# Patient Record
Sex: FEMALE | Race: WHITE | NOT HISPANIC OR LATINO | Employment: UNEMPLOYED | URBAN - METROPOLITAN AREA
[De-identification: names, ages, dates, MRNs, and addresses within clinical notes are randomized per-mention and may not be internally consistent; named-entity substitution may affect disease eponyms.]

---

## 2020-10-27 LAB
ALBUMIN SERPL BCP-MCNC: 4.7 G/DL (ref 3.5–5.2)
ALP SERPL-CCNC: 106 U/L (ref 55–135)
ALT SERPL W/O P-5'-P-CCNC: 11 U/L (ref 10–44)
ANION GAP SERPL CALC-SCNC: 12 MMOL/L (ref 8–16)
AST SERPL-CCNC: 18 U/L (ref 10–40)
BASOPHILS # BLD AUTO: 0.05 K/UL (ref 0–0.2)
BASOPHILS NFR BLD: 0.6 % (ref 0–1.9)
BILIRUB SERPL-MCNC: 0.7 MG/DL (ref 0.1–1)
BUN SERPL-MCNC: 11 MG/DL (ref 8–23)
CALCIUM SERPL-MCNC: 10 MG/DL (ref 8.7–10.5)
CHLORIDE SERPL-SCNC: 106 MMOL/L (ref 95–110)
CO2 SERPL-SCNC: 25 MMOL/L (ref 23–29)
CREAT SERPL-MCNC: 1.2 MG/DL (ref 0.5–1.4)
DIFFERENTIAL METHOD: ABNORMAL
EOSINOPHIL # BLD AUTO: 0 K/UL (ref 0–0.5)
EOSINOPHIL NFR BLD: 0.3 % (ref 0–8)
ERYTHROCYTE [DISTWIDTH] IN BLOOD BY AUTOMATED COUNT: 12.9 % (ref 11.5–14.5)
EST. GFR  (AFRICAN AMERICAN): 54 ML/MIN/1.73 M^2
EST. GFR  (NON AFRICAN AMERICAN): 47 ML/MIN/1.73 M^2
GLUCOSE SERPL-MCNC: 136 MG/DL (ref 70–110)
HCT VFR BLD AUTO: 37.9 % (ref 37–48.5)
HGB BLD-MCNC: 13.1 G/DL (ref 12–16)
IMM GRANULOCYTES # BLD AUTO: 0.02 K/UL (ref 0–0.04)
IMM GRANULOCYTES NFR BLD AUTO: 0.3 % (ref 0–0.5)
LYMPHOCYTES # BLD AUTO: 1.1 K/UL (ref 1–4.8)
LYMPHOCYTES NFR BLD: 14.3 % (ref 18–48)
MAGNESIUM SERPL-MCNC: 2 MG/DL (ref 1.6–2.6)
MCH RBC QN AUTO: 30.9 PG (ref 27–31)
MCHC RBC AUTO-ENTMCNC: 34.6 G/DL (ref 32–36)
MCV RBC AUTO: 89 FL (ref 82–98)
MONOCYTES # BLD AUTO: 0.9 K/UL (ref 0.3–1)
MONOCYTES NFR BLD: 10.9 % (ref 4–15)
NEUTROPHILS # BLD AUTO: 5.9 K/UL (ref 1.8–7.7)
NEUTROPHILS NFR BLD: 73.6 % (ref 38–73)
NRBC BLD-RTO: 0 /100 WBC
PLATELET # BLD AUTO: 343 K/UL (ref 150–350)
PMV BLD AUTO: 10 FL (ref 9.2–12.9)
POTASSIUM SERPL-SCNC: 3.2 MMOL/L (ref 3.5–5.1)
PROT SERPL-MCNC: 7.8 G/DL (ref 6–8.4)
RBC # BLD AUTO: 4.24 M/UL (ref 4–5.4)
SODIUM SERPL-SCNC: 143 MMOL/L (ref 136–145)
WBC # BLD AUTO: 7.95 K/UL (ref 3.9–12.7)

## 2020-10-27 PROCEDURE — 99285 EMERGENCY DEPT VISIT HI MDM: CPT | Mod: 25

## 2020-10-27 PROCEDURE — 96376 TX/PRO/DX INJ SAME DRUG ADON: CPT

## 2020-10-27 PROCEDURE — 84484 ASSAY OF TROPONIN QUANT: CPT

## 2020-10-27 PROCEDURE — 83735 ASSAY OF MAGNESIUM: CPT

## 2020-10-27 PROCEDURE — 85379 FIBRIN DEGRADATION QUANT: CPT

## 2020-10-27 PROCEDURE — 85025 COMPLETE CBC W/AUTO DIFF WBC: CPT

## 2020-10-27 PROCEDURE — 96374 THER/PROPH/DIAG INJ IV PUSH: CPT

## 2020-10-27 PROCEDURE — 80053 COMPREHEN METABOLIC PANEL: CPT

## 2020-10-28 ENCOUNTER — HOSPITAL ENCOUNTER (EMERGENCY)
Facility: HOSPITAL | Age: 65
End: 2020-10-28
Attending: EMERGENCY MEDICINE
Payer: MEDICAID

## 2020-10-28 VITALS
OXYGEN SATURATION: 98 % | TEMPERATURE: 98 F | SYSTOLIC BLOOD PRESSURE: 110 MMHG | HEART RATE: 77 BPM | DIASTOLIC BLOOD PRESSURE: 66 MMHG | WEIGHT: 150 LBS | RESPIRATION RATE: 18 BRPM

## 2020-10-28 DIAGNOSIS — F41.9 ANXIETY: ICD-10-CM

## 2020-10-28 DIAGNOSIS — R00.0 TACHYCARDIA: ICD-10-CM

## 2020-10-28 DIAGNOSIS — F99 INSOMNIA DUE TO OTHER MENTAL DISORDER: ICD-10-CM

## 2020-10-28 DIAGNOSIS — F32.A DEPRESSION, UNSPECIFIED DEPRESSION TYPE: Primary | ICD-10-CM

## 2020-10-28 DIAGNOSIS — F51.05 INSOMNIA DUE TO OTHER MENTAL DISORDER: ICD-10-CM

## 2020-10-28 LAB
AMPHET+METHAMPHET UR QL: NEGATIVE
APAP SERPL-MCNC: <3 UG/ML (ref 10–20)
BACTERIA #/AREA URNS HPF: ABNORMAL /HPF
BARBITURATES UR QL SCN>200 NG/ML: NEGATIVE
BENZODIAZ UR QL SCN>200 NG/ML: NEGATIVE
BILIRUB UR QL STRIP: NEGATIVE
BZE UR QL SCN: NEGATIVE
CANNABINOIDS UR QL SCN: NEGATIVE
CLARITY UR: CLEAR
COLOR UR: ABNORMAL
CREAT UR-MCNC: >450 MG/DL (ref 15–325)
D DIMER PPP IA.FEU-MCNC: 1.13 MG/L FEU
ETHANOL SERPL-MCNC: <10 MG/DL
GLUCOSE UR QL STRIP: NEGATIVE
HGB UR QL STRIP: NEGATIVE
HYALINE CASTS #/AREA URNS LPF: 35 /LPF
KETONES UR QL STRIP: ABNORMAL
LEUKOCYTE ESTERASE UR QL STRIP: NEGATIVE
METHADONE UR QL SCN>300 NG/ML: NEGATIVE
MICROSCOPIC COMMENT: ABNORMAL
NITRITE UR QL STRIP: NEGATIVE
OPIATES UR QL SCN: NEGATIVE
PCP UR QL SCN>25 NG/ML: NEGATIVE
PH UR STRIP: 6 [PH] (ref 5–8)
PROT UR QL STRIP: ABNORMAL
RBC #/AREA URNS HPF: 2 /HPF (ref 0–4)
SARS-COV-2 RDRP RESP QL NAA+PROBE: NEGATIVE
SP GR UR STRIP: 1.01 (ref 1–1.03)
SQUAMOUS #/AREA URNS HPF: 3 /HPF
TOXICOLOGY INFORMATION: ABNORMAL
TROPONIN I SERPL DL<=0.01 NG/ML-MCNC: <0.006 NG/ML (ref 0–0.03)
TROPONIN I SERPL DL<=0.01 NG/ML-MCNC: <0.006 NG/ML (ref 0–0.03)
TSH SERPL DL<=0.005 MIU/L-ACNC: 1.19 UIU/ML (ref 0.4–4)
TSH SERPL DL<=0.005 MIU/L-ACNC: 1.3 UIU/ML (ref 0.4–4)
URN SPEC COLLECT METH UR: ABNORMAL
UROBILINOGEN UR STRIP-ACNC: NEGATIVE EU/DL
WBC #/AREA URNS HPF: 0 /HPF (ref 0–5)

## 2020-10-28 PROCEDURE — 80320 DRUG SCREEN QUANTALCOHOLS: CPT

## 2020-10-28 PROCEDURE — 80307 DRUG TEST PRSMV CHEM ANLYZR: CPT

## 2020-10-28 PROCEDURE — 80329 ANALGESICS NON-OPIOID 1 OR 2: CPT

## 2020-10-28 PROCEDURE — 25000003 PHARM REV CODE 250: Performed by: EMERGENCY MEDICINE

## 2020-10-28 PROCEDURE — U0002 COVID-19 LAB TEST NON-CDC: HCPCS

## 2020-10-28 PROCEDURE — G0425 PR INPT TELEHEALTH CONSULT 30M: ICD-10-PCS | Mod: 95,,, | Performed by: STUDENT IN AN ORGANIZED HEALTH CARE EDUCATION/TRAINING PROGRAM

## 2020-10-28 PROCEDURE — 63600175 PHARM REV CODE 636 W HCPCS: Performed by: EMERGENCY MEDICINE

## 2020-10-28 PROCEDURE — 93005 ELECTROCARDIOGRAM TRACING: CPT

## 2020-10-28 PROCEDURE — G0425 INPT/ED TELECONSULT30: HCPCS | Mod: 95,,, | Performed by: STUDENT IN AN ORGANIZED HEALTH CARE EDUCATION/TRAINING PROGRAM

## 2020-10-28 PROCEDURE — 25500020 PHARM REV CODE 255: Performed by: EMERGENCY MEDICINE

## 2020-10-28 PROCEDURE — 81000 URINALYSIS NONAUTO W/SCOPE: CPT | Mod: 59

## 2020-10-28 PROCEDURE — 84443 ASSAY THYROID STIM HORMONE: CPT | Mod: 91

## 2020-10-28 PROCEDURE — 84484 ASSAY OF TROPONIN QUANT: CPT

## 2020-10-28 RX ADMIN — LORAZEPAM 1 MG: 2 INJECTION INTRAMUSCULAR; INTRAVENOUS at 01:10

## 2020-10-28 RX ADMIN — SODIUM CHLORIDE 1000 ML: 0.9 INJECTION, SOLUTION INTRAVENOUS at 01:10

## 2020-10-28 RX ADMIN — IOHEXOL 100 ML: 350 INJECTION, SOLUTION INTRAVENOUS at 02:10

## 2020-10-28 RX ADMIN — LORAZEPAM 1 MG: 2 INJECTION INTRAMUSCULAR; INTRAVENOUS at 05:10

## 2020-10-28 NOTE — ED NOTES
Pt constantly calling nurse and staff into room for asking the same questions about her plan of care. Dr. Ross notified.

## 2020-10-28 NOTE — FIRST PROVIDER EVALUATION
Emergency Department TeleTriage Encounter Note      CHIEF COMPLAINT    Chief Complaint   Patient presents with    Anxiety     Complainig of anxiety.  Extremely anxious. States she is so thirsty states she is OCD. Denies being suicidal. Complaining of pains all over body. Feels worse when she eats. States she has been having trouble talking for a week.  Has not slept in 3 weeks.       VITAL SIGNS   Initial Vitals [10/27/20 2109]   BP Pulse Resp Temp SpO2   131/87 110 (!) 22 98.7 °F (37.1 °C) --      MAP       --            ALLERGIES    Review of patient's allergies indicates:  No Known Allergies    PROVIDER TRIAGE NOTE  This is a teletriage evaluation of a 64 y.o. female presenting to the ED with c/o anxiety times 2-3 weeks with severe insomnia.  Reports history of anxiety, appears very anxious, tangential with pressured speech. Initial orders will be placed and care will be transferred to an alternate provider when patient is roomed for a full evaluation. Any additional orders and the final disposition will be determined by that provider.         ORDERS  Labs Reviewed   CBC W/ AUTO DIFFERENTIAL   COMPREHENSIVE METABOLIC PANEL   MAGNESIUM   URINALYSIS, REFLEX TO URINE CULTURE       ED Orders (720h ago, onward)    Start Ordered     Status Ordering Provider    10/27/20 2130 10/27/20 2129  Insert peripheral IV  Continuous      Ordered LIVAN CASSIDY    10/27/20 2130 10/27/20 2129  CBC auto differential  STAT  Collect    Ordered LIVAN CASSIDY    10/27/20 2130 10/27/20 2129  Comprehensive metabolic panel  STAT  Collect    Ordered LIVAN CASSIDY    10/27/20 2130 10/27/20 2129  Magnesium  STAT  Collect    Ordered LIVAN CASSIDY    10/27/20 2130 10/27/20 2129  Urinalysis, Reflex to Urine Culture Urine, Clean Catch  STAT      Ordered LIVAN CASSIDY            Virtual Visit Note: The provider triage portion of this emergency department evaluation and documentation was performed via [employer], a HIPAA-compliant  telemedicine application, in concert with a tele-presenter in the room. A face to face patient evaluation with one of my colleagues will occur once the patient is placed in an emergency department room.      DISCLAIMER: This note was prepared with [employer] voice recognition transcription software. Garbled syntax, mangled pronouns, and other bizarre constructions may be attributed to that software system.

## 2020-10-28 NOTE — ED PROVIDER NOTES
Encounter Date: 10/27/2020    SCRIBE #1 NOTE: I, Екатерина Garcia, am scribing for, and in the presence of,  Dr. Cradoso. I have scribed the entire note.       History     Chief Complaint   Patient presents with    Anxiety     Complainig of anxiety.  Extremely anxious. States she is so thirsty states she is OCD. Denies being suicidal. Complaining of pains all over body. Feels worse when she eats. States she has been having trouble talking for a week.  Has not slept in 3 weeks.       Time seen by provider: 12:52 AM on 10/28/2020    The patient is a 64 y.o. female who presents to the ED with complaint of severe anxiety which onset for over a week. Patient reports she has not slept in weeks. Symptoms are worsening in severity. Associated sxs include chest pain, lower abdominal pain, and seeing spots. Patient denies any hallucinations, suicidal ideation, fever, chills, abdominal pain, nausea, vomiting, and all other sxs at this time. Patient is non compliant with her medication because it has not been working for her.     has no past medical history of OCD, TAM, MDD, hypothyroidism, OA, hypokalemia, B-12 anemia, vitamin D def, GERD, tardive dyskinesia, allergic rhinitis, hiatal hernia.  has no past surgical history on file.    The history is provided by the patient.     Review of patient's allergies indicates:  No Known Allergies  No past medical history on file.  No past surgical history on file.  No family history on file.  Social History     Tobacco Use    Smoking status: Not on file   Substance Use Topics    Alcohol use: Not on file    Drug use: Not on file     Review of Systems   Constitutional: Negative for chills and fever.   HENT: Negative for ear pain and sore throat.    Eyes: Negative for redness.   Respiratory: Negative for shortness of breath.    Cardiovascular: Positive for chest pain.   Gastrointestinal: Negative for abdominal pain, diarrhea and vomiting.   Genitourinary: Negative for dysuria.    Musculoskeletal: Negative for back pain.   Skin: Negative for rash.   Neurological: Positive for tremors. Negative for headaches.   Psychiatric/Behavioral: Negative for hallucinations and suicidal ideas. The patient is nervous/anxious.        Physical Exam     Initial Vitals   BP Pulse Resp Temp SpO2   10/27/20 2109 10/27/20 2109 10/27/20 2109 10/27/20 2109 10/28/20 0951   131/87 110 (!) 22 98.7 °F (37.1 °C) 96 %      MAP       --                Physical Exam    Constitutional:   Elderly. chronically ill appearing   HENT:   Head: Normocephalic and atraumatic.   Eyes: Pupils are equal, round, and reactive to light.   Neck: Normal range of motion.   Cardiovascular:   Tachycardic rate an rhythm    Pulmonary/Chest: Breath sounds normal. No respiratory distress.   Abdominal: Soft. She exhibits no distension. There is no abdominal tenderness.   Musculoskeletal: Normal range of motion.   Neurological: She is alert and oriented to person, place, and time. She has normal strength.   Skin: Skin is warm and dry.   Psychiatric:   Anxious, extremely nervous, visual hallucinations          ED Course   Procedures  Labs Reviewed   CBC W/ AUTO DIFFERENTIAL - Abnormal; Notable for the following components:       Result Value    Gran % 73.6 (*)     Lymph % 14.3 (*)     All other components within normal limits   COMPREHENSIVE METABOLIC PANEL - Abnormal; Notable for the following components:    Potassium 3.2 (*)     Glucose 136 (*)     eGFR if  54 (*)     eGFR if non  47 (*)     All other components within normal limits   URINALYSIS, REFLEX TO URINE CULTURE - Abnormal; Notable for the following components:    Color, UA Orange (*)     Protein, UA 1+ (*)     Ketones, UA 1+ (*)     All other components within normal limits    Narrative:     Specimen Source->Urine   D DIMER, QUANTITATIVE - Abnormal; Notable for the following components:    D-Dimer 1.13 (*)     All other components within normal limits    URINALYSIS MICROSCOPIC - Abnormal; Notable for the following components:    Bacteria Few (*)     Hyaline Casts, UA 35 (*)     All other components within normal limits    Narrative:     Specimen Source->Urine   ACETAMINOPHEN LEVEL - Abnormal; Notable for the following components:    Acetaminophen (Tylenol), Serum <3.0 (*)     All other components within normal limits   DRUG SCREEN PANEL, URINE EMERGENCY - Abnormal; Notable for the following components:    Creatinine, Urine >450.0 (*)     All other components within normal limits    Narrative:     Specimen Source->Urine   MAGNESIUM   TROPONIN I   D DIMER, QUANTITATIVE   TROPONIN I   TSH   TROPONIN I   TSH   ALCOHOL,MEDICAL (ETHANOL)   SARS-COV-2 RNA AMPLIFICATION, QUAL   DRUG SCREEN PANEL, URINE EMERGENCY   DRUG SCREEN PANEL, URINE EMERGENCY        ECG Results          EKG 12-lead (In process)  Result time 10/28/20 09:57:51    In process by Interface, Lab In [facility] (10/28/20 09:57:51)                 Narrative:    Test Reason : F41.9,    Vent. Rate : 078 BPM     Atrial Rate : 078 BPM     P-R Int : 138 ms          QRS Dur : 080 ms      QT Int : 426 ms       P-R-T Axes : 065 034 071 degrees     QTc Int : 485 ms    Sinus rhythm with Premature supraventricular complexes  Low voltage QRS  Cannot rule out Anterior infarct ,age undetermined  Abnormal ECG  No previous ECGs available    Referred By: [email or web address]   SELF           Confirmed By:                             Imaging Results          CTA Chest Non-Coronary (PE Study) (Final result)  Result time 10/28/20 02:32:58    Final result by Britt Esqueda MD (10/28/20 02:32:58)                 Impression:      No evidence of pulmonary thromboembolism or other acute intrathoracic abnormality.    4 mm right middle lobe pulmonary nodules.  For multiple solid nodules all <6 mm, Fleischner Society 2017 guidelines recommend no routine follow up for a low risk patient, or follow up with non-contrast chest CT at 12 months after  discovery in a high risk patient.    Prominent/mildly enlarged right hilar lymph nodes.  Clinical correlation is advised.    Moderate-sized hiatal hernia.      Electronically signed by: Britt Esqueda MD  Date:    10/28/2020  Time:    02:32             Narrative:    EXAMINATION:  CTA CHEST NON CORONARY    CLINICAL HISTORY:  PE suspected, intermediate prob, positive D-dimer;    TECHNIQUE:  Low dose axial images, sagittal and coronal reformations were obtained from the thoracic inlet to the lung bases following the IV administration of 100 mL of Omnipaque 350.  Contrast timing was optimized to evaluate the pulmonary arteries.  MIP images were performed.    COMPARISON:  None    FINDINGS:  The thoracic aorta maintains normal caliber, contour, and course without significant atherosclerotic calcification within its course.  There is no evidence of aneurysmal dilation or dissection. The heart is not enlarged and there is no evidence of pericardial effusion.There is a moderate-sized hiatal hernia present..There is no axillary or mediastinal adenopathy.  There are prominent/mildly enlarged right hilar lymph nodes measuring up to 1.4 cm in short axis diameter..    The trachea is midline and proximal airways are patent. The lungs are symmetrically expanded. There are two 4 mm pulmonary nodules in the right middle lobe and additional pleural based right middle lobe 4 mm pulmonary nodule.  There is no evidence of focal consolidation, pleural fluid or pneumothorax.    Allowing for artifact from dense contrast bolus in the SVC, the pulmonary arteries demonstrate no filling defects to suggest pulmonary thromboembolism.    The visualized structures of the upper abdomen demonstrate no acute abnormalities.  The visualized osseous structures demonstrate no acute abnormalities.                               X-Ray Chest 1 View (Final result)  Result time 10/28/20 01:30:25    Final result by Pallavi Simms MD (10/28/20 01:30:25)                  Impression:      No acute appearing abnormalities with the lungs appearing clear.  There is no cardiomegaly.    Incidental findings as above.      Electronically signed by: Pallavi Mendez  Date:    10/28/2020  Time:    01:30             Narrative:    EXAMINATION:  XR CHEST 1 VIEW    CLINICAL HISTORY:  Tachycardia, unspecified    TECHNIQUE:  Single frontal view of the chest was performed.    COMPARISON:  None    FINDINGS:  Cardiac silhouette is normal in size.  Pulmonary vasculature is normal.  The lungs are clear.  There is no pleural effusion or pneumothorax.  Lucency over the mediastinum raises question of a hiatal hernia.  There is dextroscoliosis the thoracic spine incidentally noted.                                 Medical Decision Making:   History:   Old Medical Records: I decided to obtain old medical records.  Initial Assessment:   This is a 64-year-old female with a history of OCD, general anxiety disorder, major depressive disorder, hypothyroidism, hypokalemia, tardive dyskinesia who presents the ER for evaluation of insomnia, anxiety.  Onset over a week, reports has not been taking her Remeron, Valium or Ativan.  Reports has been having difficulty sleeping and generalized malaise.  She is extremely anxious without any HI or SI.  She does appear to have a flight of ideas.  Will obtain blood work to rule out infection, electrolyte abnormality, NSTEMI, PE.  Will give Ativan and fluids and will reassess.  Will obtain tele psych consult.  Independently Interpreted Test(s):   I have ordered and independently interpreted EKG Reading(s) - see prior notes  Clinical Tests:   Lab Tests: Ordered and Reviewed  Radiological Study: Ordered and Reviewed  Medical Tests: Ordered and Reviewed                   ED Course as of Oct 28 1528   Wed Oct 28, 2020   0121 D-dimer positive.  Will obtain CTA to rule PE    [SE]   0434 Resting in bed, no distress, tele psych obtain.  Will plan pec per recommendation.    [SE]       ED Course User Index  [SE] Fátima Cardoso MD            Clinical Impression:       ICD-10-CM ICD-9-CM   1. Depression, unspecified depression type  F32.9 311   2. Anxiety  F41.9 300.00   3. Tachycardia  R00.0 785.0   4. Insomnia due to other mental disorder  F51.05 300.9    F99 327.02                      Disposition:   Disposition: Transferred  Condition: Fair     ED Disposition Condition    Transfer to Psych Facility         ED Prescriptions     None        Follow-up Information    None                         My Scribe Attestation: I acknowledge that the documentation on this chart was provided by described on the date of service noted above and that the documentation in the chart accurately reflects work and decisions made by me alone.             Fátima Cardoso MD  10/28/20 1524

## 2020-10-28 NOTE — ED NOTES
Pt getting out of bed coming to nursing station asking same questions about blood test and treatment.  And myself explained all of this too pt but she still repeats the same questions.  Notified

## 2020-10-28 NOTE — ED NOTES
FAMILY CONTACT BROTHER ISABELLE GÓMEZ LIVES OUT OF [facility] [phone]  WOULD LIKE PROVIDER TO PLEASE CONTACT HIM.

## 2020-10-28 NOTE — ED NOTES
Report received AB [facility] assumed. Pt resting quietly in bed/sitter@bedside. Respirations even and unlabored. Pt VSS. Will continue to monitor.

## 2020-10-28 NOTE — CONSULTS
"[facility]  Psychiatry  Telepsychiatry Consult Note    Please see previous notes:    Patient agreeable to consultation via telepsychiatry.    Tele-Consultation from Psychiatry started: 10/28/2020 at 321  The chief complaint leading to psychiatric consultation is: Bizarre Behavior  This consultation was requested by Dr. Cardoso, the Emergency Department attending physician.  The patient location is  [facility] EMERGENCY DEPARTMENT       Patient Identification:   Sherie Fernandez is a 64 y.o. female.    Patient information was obtained from patient.  Patient presented voluntarily to the Emergency Department ambulatory.    Inpatient consult to Psychiatric Telemedicine  Consult performed by: Gil Aviles MD  Consult ordered by: Fátima Cardoso MD        Subjective:     History of Present Illness:    "Complaining of anxiety.  Extremely anxious. States she is so thirsty states she is OCD. Denies being suicidal. Complaining of pains all over body. Feels worse when she eats. States she has been having trouble talking for a week. Has not slept in 3 weeks."    65 yo CF with hx of Severe anxiety, OCD, presented for not sleeping for a" couple months," insomnia. Pt is having difficulty formulating sentences, endorses feeling "terrified," has been paranoid recently, has been taking Ativan 1mg TID for 10 years and it is not working, ambien for 2 years. Ativan making her worse. Has taken TCAs in past, but not helpful. Does not feel comfortable going home, lives with brother. Wants help, wants meds changed. Moved here 4 months ago, does not have a psychiatrist or internist.     Psychiatric History:   Previous Psychiatric Hospitalizations: Yes, last time was 4 years ago  Previous Medication Trials: Yes   Previous Suicide Attempts: no   History of Violence: no  History of Depression: no  History of Janine: no  History of Auditory/Visual Hallucination possible, seeing bright lights  History of Delusions: yes, ongoing  Outpatient " "psychiatrist (current & past): No    Substance Abuse History:  Tobacco:No  Alcohol: No  Illicit Substances:No  Detox/Rehab: No    Legal History: Past charges/incarcerations: No     Family Psychiatric History: uncle with unknown mental illness    Social History:  Disabled, lives with brother    Psychiatric Mental Status Exam:  Arousal: alert  Sensorium/Orientation: oriented to grossly intact  Behavior/Cooperation: normal, cooperative, restless and fidgety    Speech: articulation error, delayed, increased latency of response  Language: grossly intact  Mood: " terrified "   Affect: anxious  Thought Process: tangential  Thought Content:   Auditory hallucinations: NO  Visual hallucinations: YES:      Paranoia: YES:      Delusions:  YES:      Suicidal ideation: NO  Homicidal ideation: NO  Insight: has awareness of illness  Judgment: limited      Past Medical History: No past medical history on file.   Laboratory Data:   Labs Reviewed   CBC W/ AUTO DIFFERENTIAL - Abnormal; Notable for the following components:       Result Value    Gran % 73.6 (*)     Lymph % 14.3 (*)     All other components within normal limits   COMPREHENSIVE METABOLIC PANEL - Abnormal; Notable for the following components:    Potassium 3.2 (*)     Glucose 136 (*)     eGFR if  54 (*)     eGFR if non  47 (*)     All other components within normal limits   D DIMER, QUANTITATIVE - Abnormal; Notable for the following components:    D-Dimer 1.13 (*)     All other components within normal limits   MAGNESIUM   TROPONIN I   D DIMER, QUANTITATIVE   TROPONIN I   TSH   TROPONIN I   URINALYSIS, REFLEX TO URINE CULTURE       Neurological History:  Seizures: No  Head trauma: No    Allergies:   Review of patient's allergies indicates:  No Known Allergies    Medications in ER:   Medications   sodium chloride 0.9% bolus 1,000 mL (1,000 mLs Intravenous New Bag 10/28/20 0158)   lorazepam (ATIVAN) injection 1 mg (1 mg Intravenous Given " 10/28/20 0126)   iohexoL (OMNIPAQUE 350) injection 100 mL (100 mLs Intravenous Given 10/28/20 0212)       Medications at home:     No new subjective & objective note has been filed under this hospital service since the last note was generated.      Assessment - Diagnosis - Goals:     Diagnosis/Impression:   -TAM  -OCD  -Possible benzodiazepine withdrawal    Rec:   -Recommend 2mg IV ativan x 1 now for anxiety and insomnia  -Recommend PEC and transfer to inpatient psych, pt may need to be detoxed safely off of benzodiazepines. Pt currently paranoid, feeling terrified, possible VH, has not slept in months per pt, and will require stabilization. Consider Luvox.     Time with patient: 30      More than 50% of the time was spent counseling/coordinating care    Consulting clinician was informed of the encounter and consult note.    Consultation ended: 10/28/2020 at 3:55 AM     Gil Aviles MD   Psychiatry  [facility]

## 2020-10-28 NOTE — ED TRIAGE NOTES
Pt c/o feeling weird and body aches all over. Pt states she don't know why she is feeling this way. Pt also reports she cannot sleep and haven't slept normal or had a good night rest in 3 wks.pt reports she lives with her younger brother and he helps her. Will cont to monitor.

## 2022-06-01 ENCOUNTER — OFFICE VISIT (OUTPATIENT)
Dept: INTERNAL MEDICINE | Facility: CLINIC | Age: 67
End: 2022-06-01
Payer: MEDICAID

## 2022-06-01 VITALS
WEIGHT: 141.75 LBS | HEART RATE: 108 BPM | BODY MASS INDEX: 26.09 KG/M2 | DIASTOLIC BLOOD PRESSURE: 72 MMHG | OXYGEN SATURATION: 98 % | SYSTOLIC BLOOD PRESSURE: 110 MMHG | HEIGHT: 62 IN

## 2022-06-01 DIAGNOSIS — R07.9 CHEST PAIN, UNSPECIFIED TYPE: Primary | ICD-10-CM

## 2022-06-01 PROCEDURE — 3078F DIAST BP <80 MM HG: CPT | Mod: CPTII,,, | Performed by: INTERNAL MEDICINE

## 2022-06-01 PROCEDURE — 1126F AMNT PAIN NOTED NONE PRSNT: CPT | Mod: CPTII,,, | Performed by: INTERNAL MEDICINE

## 2022-06-01 PROCEDURE — 99213 OFFICE O/P EST LOW 20 MIN: CPT | Mod: PBBFAC,PO | Performed by: INTERNAL MEDICINE

## 2022-06-01 PROCEDURE — 99205 OFFICE O/P NEW HI 60 MIN: CPT | Mod: S$PBB,,, | Performed by: INTERNAL MEDICINE

## 2022-06-01 PROCEDURE — 3288F PR FALLS RISK ASSESSMENT DOCUMENTED: ICD-10-PCS | Mod: CPTII,,, | Performed by: INTERNAL MEDICINE

## 2022-06-01 PROCEDURE — 3008F BODY MASS INDEX DOCD: CPT | Mod: CPTII,,, | Performed by: INTERNAL MEDICINE

## 2022-06-01 PROCEDURE — 3074F SYST BP LT 130 MM HG: CPT | Mod: CPTII,,, | Performed by: INTERNAL MEDICINE

## 2022-06-01 PROCEDURE — 3078F PR MOST RECENT DIASTOLIC BLOOD PRESSURE < 80 MM HG: ICD-10-PCS | Mod: CPTII,,, | Performed by: INTERNAL MEDICINE

## 2022-06-01 PROCEDURE — 3288F FALL RISK ASSESSMENT DOCD: CPT | Mod: CPTII,,, | Performed by: INTERNAL MEDICINE

## 2022-06-01 PROCEDURE — 3074F PR MOST RECENT SYSTOLIC BLOOD PRESSURE < 130 MM HG: ICD-10-PCS | Mod: CPTII,,, | Performed by: INTERNAL MEDICINE

## 2022-06-01 PROCEDURE — 99999 PR PBB SHADOW E&M-EST. PATIENT-LVL III: CPT | Mod: PBBFAC,,, | Performed by: INTERNAL MEDICINE

## 2022-06-01 PROCEDURE — 99999 PR PBB SHADOW E&M-EST. PATIENT-LVL III: ICD-10-PCS | Mod: PBBFAC,,, | Performed by: INTERNAL MEDICINE

## 2022-06-01 PROCEDURE — 99205 PR OFFICE/OUTPT VISIT, NEW, LEVL V, 60-74 MIN: ICD-10-PCS | Mod: S$PBB,,, | Performed by: INTERNAL MEDICINE

## 2022-06-01 PROCEDURE — 1101F PR PT FALLS ASSESS DOC 0-1 FALLS W/OUT INJ PAST YR: ICD-10-PCS | Mod: CPTII,,, | Performed by: INTERNAL MEDICINE

## 2022-06-01 PROCEDURE — 1126F PR PAIN SEVERITY QUANTIFIED, NO PAIN PRESENT: ICD-10-PCS | Mod: CPTII,,, | Performed by: INTERNAL MEDICINE

## 2022-06-01 PROCEDURE — 3008F PR BODY MASS INDEX (BMI) DOCUMENTED: ICD-10-PCS | Mod: CPTII,,, | Performed by: INTERNAL MEDICINE

## 2022-06-01 PROCEDURE — 1101F PT FALLS ASSESS-DOCD LE1/YR: CPT | Mod: CPTII,,, | Performed by: INTERNAL MEDICINE

## 2022-07-03 PROBLEM — R10.84 GENERALIZED ABDOMINAL PAIN: Status: ACTIVE | Noted: 2022-07-03

## 2022-07-03 PROBLEM — Z86.73 HISTORY OF CVA (CEREBROVASCULAR ACCIDENT): Status: ACTIVE | Noted: 2022-07-03

## 2022-07-03 PROBLEM — E78.5 HLD (HYPERLIPIDEMIA): Status: ACTIVE | Noted: 2022-07-03

## 2022-07-03 PROBLEM — E03.9 HYPOTHYROIDISM: Status: ACTIVE | Noted: 2022-07-03

## 2022-07-15 PROBLEM — Z71.2 ENCOUNTER TO DISCUSS TEST RESULTS: Status: ACTIVE | Noted: 2022-07-15

## 2022-12-13 ENCOUNTER — HOSPITAL ENCOUNTER (EMERGENCY)
Facility: HOSPITAL | Age: 67
Discharge: PSYCHIATRIC OR SUBSTANCE USE FACILITY | End: 2022-12-14
Attending: EMERGENCY MEDICINE
Payer: MEDICAID

## 2022-12-13 DIAGNOSIS — Z00.8 MEDICAL CLEARANCE FOR PSYCHIATRIC ADMISSION: ICD-10-CM

## 2022-12-13 DIAGNOSIS — R53.1 WEAKNESS: ICD-10-CM

## 2022-12-13 DIAGNOSIS — R06.02 SHORTNESS OF BREATH: ICD-10-CM

## 2022-12-13 DIAGNOSIS — F41.9 ANXIETY: Primary | ICD-10-CM

## 2022-12-13 LAB
AMPHET+METHAMPHET UR QL: NEGATIVE
BACTERIA #/AREA URNS HPF: ABNORMAL /HPF
BARBITURATES UR QL SCN>200 NG/ML: NEGATIVE
BASOPHILS # BLD AUTO: 0.12 K/UL (ref 0–0.2)
BASOPHILS NFR BLD: 1.6 % (ref 0–1.9)
BENZODIAZ UR QL SCN>200 NG/ML: ABNORMAL
BILIRUB UR QL STRIP: NEGATIVE
BZE UR QL SCN: NEGATIVE
CANNABINOIDS UR QL SCN: NEGATIVE
CLARITY UR: CLEAR
COLOR UR: YELLOW
CREAT UR-MCNC: 208.9 MG/DL (ref 15–325)
DIFFERENTIAL METHOD: ABNORMAL
EOSINOPHIL # BLD AUTO: 0.2 K/UL (ref 0–0.5)
EOSINOPHIL NFR BLD: 2.5 % (ref 0–8)
ERYTHROCYTE [DISTWIDTH] IN BLOOD BY AUTOMATED COUNT: 14.5 % (ref 11.5–14.5)
GLUCOSE UR QL STRIP: NEGATIVE
HCT VFR BLD AUTO: 34.2 % (ref 37–48.5)
HGB BLD-MCNC: 10.9 G/DL (ref 12–16)
HGB UR QL STRIP: NEGATIVE
IMM GRANULOCYTES # BLD AUTO: 0.02 K/UL (ref 0–0.04)
IMM GRANULOCYTES NFR BLD AUTO: 0.3 % (ref 0–0.5)
KETONES UR QL STRIP: ABNORMAL
LEUKOCYTE ESTERASE UR QL STRIP: ABNORMAL
LYMPHOCYTES # BLD AUTO: 0.8 K/UL (ref 1–4.8)
LYMPHOCYTES NFR BLD: 11.1 % (ref 18–48)
MCH RBC QN AUTO: 27.9 PG (ref 27–31)
MCHC RBC AUTO-ENTMCNC: 31.9 G/DL (ref 32–36)
MCV RBC AUTO: 88 FL (ref 82–98)
METHADONE UR QL SCN>300 NG/ML: NEGATIVE
MICROSCOPIC COMMENT: ABNORMAL
MONOCYTES # BLD AUTO: 0.6 K/UL (ref 0.3–1)
MONOCYTES NFR BLD: 7.7 % (ref 4–15)
NEUTROPHILS # BLD AUTO: 5.8 K/UL (ref 1.8–7.7)
NEUTROPHILS NFR BLD: 76.8 % (ref 38–73)
NITRITE UR QL STRIP: NEGATIVE
NRBC BLD-RTO: 0 /100 WBC
OPIATES UR QL SCN: NEGATIVE
PCP UR QL SCN>25 NG/ML: ABNORMAL
PH UR STRIP: 6 [PH] (ref 5–8)
PLATELET # BLD AUTO: 364 K/UL (ref 150–450)
PMV BLD AUTO: 8.8 FL (ref 9.2–12.9)
PROT UR QL STRIP: ABNORMAL
RBC # BLD AUTO: 3.9 M/UL (ref 4–5.4)
RBC #/AREA URNS HPF: 1 /HPF (ref 0–4)
SP GR UR STRIP: 1.02 (ref 1–1.03)
SQUAMOUS #/AREA URNS HPF: 1 /HPF
TOXICOLOGY INFORMATION: ABNORMAL
URN SPEC COLLECT METH UR: ABNORMAL
UROBILINOGEN UR STRIP-ACNC: NEGATIVE EU/DL
WBC # BLD AUTO: 7.57 K/UL (ref 3.9–12.7)
WBC #/AREA URNS HPF: 6 /HPF (ref 0–5)

## 2022-12-13 PROCEDURE — 80143 DRUG ASSAY ACETAMINOPHEN: CPT | Performed by: EMERGENCY MEDICINE

## 2022-12-13 PROCEDURE — 63600175 PHARM REV CODE 636 W HCPCS: Performed by: EMERGENCY MEDICINE

## 2022-12-13 PROCEDURE — 93005 ELECTROCARDIOGRAM TRACING: CPT

## 2022-12-13 PROCEDURE — 84443 ASSAY THYROID STIM HORMONE: CPT | Performed by: EMERGENCY MEDICINE

## 2022-12-13 PROCEDURE — 80053 COMPREHEN METABOLIC PANEL: CPT | Performed by: EMERGENCY MEDICINE

## 2022-12-13 PROCEDURE — 96376 TX/PRO/DX INJ SAME DRUG ADON: CPT

## 2022-12-13 PROCEDURE — 96374 THER/PROPH/DIAG INJ IV PUSH: CPT

## 2022-12-13 PROCEDURE — 84484 ASSAY OF TROPONIN QUANT: CPT | Performed by: EMERGENCY MEDICINE

## 2022-12-13 PROCEDURE — 93010 EKG 12-LEAD: ICD-10-PCS | Mod: ,,, | Performed by: INTERNAL MEDICINE

## 2022-12-13 PROCEDURE — 82077 ASSAY SPEC XCP UR&BREATH IA: CPT | Performed by: EMERGENCY MEDICINE

## 2022-12-13 PROCEDURE — 80307 DRUG TEST PRSMV CHEM ANLYZR: CPT | Performed by: EMERGENCY MEDICINE

## 2022-12-13 PROCEDURE — 81000 URINALYSIS NONAUTO W/SCOPE: CPT | Mod: 59 | Performed by: EMERGENCY MEDICINE

## 2022-12-13 PROCEDURE — 83690 ASSAY OF LIPASE: CPT | Performed by: EMERGENCY MEDICINE

## 2022-12-13 PROCEDURE — 85025 COMPLETE CBC W/AUTO DIFF WBC: CPT | Performed by: EMERGENCY MEDICINE

## 2022-12-13 PROCEDURE — 99285 EMERGENCY DEPT VISIT HI MDM: CPT | Mod: 25

## 2022-12-13 PROCEDURE — 93010 ELECTROCARDIOGRAM REPORT: CPT | Mod: ,,, | Performed by: INTERNAL MEDICINE

## 2022-12-13 RX ORDER — LORAZEPAM 2 MG/ML
1 INJECTION INTRAMUSCULAR
Status: COMPLETED | OUTPATIENT
Start: 2022-12-13 | End: 2022-12-13

## 2022-12-13 RX ADMIN — LORAZEPAM 1 MG: 2 INJECTION INTRAMUSCULAR; INTRAVENOUS at 11:12

## 2022-12-14 VITALS
TEMPERATURE: 98 F | BODY MASS INDEX: 25.95 KG/M2 | HEIGHT: 62 IN | SYSTOLIC BLOOD PRESSURE: 112 MMHG | WEIGHT: 141 LBS | OXYGEN SATURATION: 96 % | DIASTOLIC BLOOD PRESSURE: 58 MMHG | RESPIRATION RATE: 20 BRPM | HEART RATE: 86 BPM

## 2022-12-14 LAB
ALBUMIN SERPL BCP-MCNC: 4.1 G/DL (ref 3.5–5.2)
ALP SERPL-CCNC: 81 U/L (ref 55–135)
ALT SERPL W/O P-5'-P-CCNC: 7 U/L (ref 10–44)
ANION GAP SERPL CALC-SCNC: 15 MMOL/L (ref 8–16)
APAP SERPL-MCNC: <3 UG/ML (ref 10–20)
AST SERPL-CCNC: 15 U/L (ref 10–40)
BILIRUB SERPL-MCNC: 0.4 MG/DL (ref 0.1–1)
BUN SERPL-MCNC: 12 MG/DL (ref 8–23)
CALCIUM SERPL-MCNC: 9.7 MG/DL (ref 8.7–10.5)
CHLORIDE SERPL-SCNC: 105 MMOL/L (ref 95–110)
CO2 SERPL-SCNC: 19 MMOL/L (ref 23–29)
CREAT SERPL-MCNC: 1.1 MG/DL (ref 0.5–1.4)
CTP QC/QA: YES
EST. GFR  (NO RACE VARIABLE): 54 ML/MIN/1.73 M^2
ETHANOL SERPL-MCNC: <10 MG/DL
GLUCOSE SERPL-MCNC: 95 MG/DL (ref 70–110)
LIPASE SERPL-CCNC: 28 U/L (ref 4–60)
POTASSIUM SERPL-SCNC: 4 MMOL/L (ref 3.5–5.1)
PROT SERPL-MCNC: 7 G/DL (ref 6–8.4)
SARS-COV-2 RDRP RESP QL NAA+PROBE: NEGATIVE
SODIUM SERPL-SCNC: 139 MMOL/L (ref 136–145)
TROPONIN I SERPL DL<=0.01 NG/ML-MCNC: 0.01 NG/ML (ref 0–0.03)
TSH SERPL DL<=0.005 MIU/L-ACNC: 1.97 UIU/ML (ref 0.4–4)

## 2022-12-14 PROCEDURE — 99205 PR OFFICE/OUTPT VISIT, NEW, LEVL V, 60-74 MIN: ICD-10-PCS | Mod: 95,AF,HB, | Performed by: PSYCHIATRY & NEUROLOGY

## 2022-12-14 PROCEDURE — 99205 OFFICE O/P NEW HI 60 MIN: CPT | Mod: 95,AF,HB, | Performed by: PSYCHIATRY & NEUROLOGY

## 2022-12-14 PROCEDURE — 25000003 PHARM REV CODE 250: Performed by: STUDENT IN AN ORGANIZED HEALTH CARE EDUCATION/TRAINING PROGRAM

## 2022-12-14 PROCEDURE — 63600175 PHARM REV CODE 636 W HCPCS: Performed by: STUDENT IN AN ORGANIZED HEALTH CARE EDUCATION/TRAINING PROGRAM

## 2022-12-14 PROCEDURE — 87635 SARS-COV-2 COVID-19 AMP PRB: CPT | Performed by: EMERGENCY MEDICINE

## 2022-12-14 RX ORDER — LORAZEPAM 1 MG/1
1 TABLET ORAL EVERY 6 HOURS PRN
Status: DISCONTINUED | OUTPATIENT
Start: 2022-12-14 | End: 2022-12-14 | Stop reason: HOSPADM

## 2022-12-14 RX ORDER — LORAZEPAM 2 MG/ML
1 INJECTION INTRAMUSCULAR
Status: COMPLETED | OUTPATIENT
Start: 2022-12-14 | End: 2022-12-14

## 2022-12-14 RX ADMIN — LORAZEPAM 1 MG: 2 INJECTION INTRAMUSCULAR; INTRAVENOUS at 04:12

## 2022-12-14 RX ADMIN — LORAZEPAM 1 MG: 1 TABLET ORAL at 09:12

## 2022-12-14 NOTE — ED NOTES
Spoke to transfer center regarding update on eta_new eta is 1200 noon. Assisted pt. To call brother-no answer presently.

## 2022-12-14 NOTE — ED PROVIDER NOTES
Encounter Date: 12/13/2022       History     Chief Complaint   Patient presents with    Anxiety     Patient presents to the ED with family who reports patients anxiety is worsening and has not been sleeping. Patient states anxiety is chronic and is taking rx medications.     Weakness     Family reports patient was just dx with UTI and is on antibiotics. Patient reports having lower abdominal pain and feeling generalized weakness.     Urinary Tract Infection     Sherie Fernandez is a 67 y.o. female who  has a past medical history significant for anxiety chronic abdominal pain recurrent UTI tardive dyskinesia hypothyroidism, MDD with psychotic features and obsessive-compulsive disorder    The patient presents to the ED due to multiple complaints.  Patient is here primarily for anxiety.  She is here with her brother who lives with her.  They state that over the past couple of weeks her anxiety has become very severe with associated insomnia generalized weakness and fatigue.  Patient has had numerous evaluations by Psychiatry and admissions for similar symptoms however no one has been able to find out what is wrong.  She is in the process of filling out paperwork with her primary care physician for assisted living.  She was seen by her air physician on 12/08/2022 and was referred for outpatient CT for chronic abdominal pain Keflex for UTI.  Patient reports chronic abdominal pain which is unchanged today.  She denies any chest pain but reports trouble breathing associated with her anxiety.  No cough or fever reported.    Review of patient's allergies indicates:   Allergen Reactions    Lurasidone hcl     Olanzapine Other (See Comments)     Tardive dyskinesia, dystonia      Quetiapine      Other reaction(s): Tardive dyskinesia    Risperidone Other (See Comments)     Tardive dyskinesia.   Risperdal, Zyprexa and Latuda all caused Tardive Dyskinsia        No past medical history on file.  No past surgical history on  file.  No family history on file.  Social History     Tobacco Use    Smoking status: Never    Smokeless tobacco: Never   Substance Use Topics    Alcohol use: Never    Drug use: Never     Review of Systems   Constitutional:  Positive for activity change and fatigue.   HENT:  Negative for facial swelling.    Eyes:  Negative for discharge.   Respiratory:  Positive for shortness of breath.    Cardiovascular:  Negative for chest pain.   Gastrointestinal:  Positive for abdominal pain.   Genitourinary:  Positive for dysuria.   Musculoskeletal:  Positive for gait problem.   Neurological:  Positive for weakness. Negative for headaches.     Physical Exam     Initial Vitals [12/13/22 2247]   BP Pulse Resp Temp SpO2   121/73 81 20 98.5 °F (36.9 °C) 96 %      MAP       --         Physical Exam    Constitutional: No distress.   Appears disheveled  Chronically ill-appearing woman in no apparent distress   HENT:   Head: Normocephalic and atraumatic.   Eyes: Conjunctivae are normal.   Cardiovascular:  Normal rate, regular rhythm and intact distal pulses.           No murmur heard.  Pulmonary/Chest: Breath sounds normal. No respiratory distress. She has no wheezes. She has no rales.   Abdominal: She exhibits no distension and no mass. There is no abdominal tenderness. There is no rebound and no guarding.     Neurological: She is alert. She has normal strength. No cranial nerve deficit or sensory deficit.   Resting tremor  Ambulatory   Skin: Skin is warm and dry.   Psychiatric:   Anxious        ED Course   Procedures  Labs Reviewed   URINALYSIS, REFLEX TO URINE CULTURE - Abnormal; Notable for the following components:       Result Value    Protein, UA Trace (*)     Ketones, UA Trace (*)     Leukocytes, UA 1+ (*)     All other components within normal limits    Narrative:     Specimen Source->Urine   URINALYSIS MICROSCOPIC - Abnormal; Notable for the following components:    WBC, UA 6 (*)     All other components within normal  limits    Narrative:     Specimen Source->Urine   CBC W/ AUTO DIFFERENTIAL   COMPREHENSIVE METABOLIC PANEL   TSH   DRUG SCREEN PANEL, URINE EMERGENCY   ALCOHOL,MEDICAL (ETHANOL)   ACETAMINOPHEN LEVEL   SARS-COV-2 RNA AMPLIFICATION, QUAL   TROPONIN I   LIPASE          Imaging Results    None          Medications   LORazepam injection 1 mg (has no administration in time range)     Medical Decision Making:   Initial Assessment:   Patient presents today due to worsening anxiety, discomfort and chronic abdominal pain activity change and weakness.  Her vital signs are stable.  She does appear anxious on exam.  Patient's symptoms are concerning for grave disability however given the chronic nature will plan to obtain a psychiatric consult for further recommendations on medications and placement.  Will also obtain labs including urinalysis and reassess need for emergent CT imaging.  Differential Diagnosis:   Differential Diagnosis includes, but is not limited to:  Decompensated psychiatric disease (schizophrenia, bipolar disorder, major depression), excited delirium, medication noncompliance, substance abuse/withdrawal, intentional drug overdose, medication toxicity, APAP/ASA overdose, acute stress reaction, personality disorder, malingering, metabolic derangement     ED Management:  At time of shift change, patient's ED workup incomplete. Oncoming ED physician to continue care. All relevant details were discussed, including pending workup and planned disposition. See summary below.    HPI: patient with progressive anxiety, chronic abdominal pain, recent diagnosis for UTI, in process of transitioning to assisted living  Workup: labs, ecg, cxr  Pending: labs, ecg, cxr, telepsych consult  Disposition: pending results, patient lives with her brother and may be discharged home if appropriate            ED Course as of 12/14/22 1708   Tue Dec 13, 2022   4925 Patient's brotherReagan requests to be called with any updates.  His  phone number is:  [phone] [RN]   Wed Dec 14, 2022   0522 Patient handed off to me pending medical clearance and evaluation by TelePsychiatry. Deemed Gravely disabled by psychiatry. Medically clear at this time. Will seek appropriate transfer to facility with psychiatric capabilities. [KB]      ED Course User Index  [KB] Rob Blake MD  [RN] Los Bhatti Jr., MD                 Clinical Impression:   Final diagnoses:  [Z00.8] Medical clearance for psychiatric admission  [R06.02] Shortness of breath  [F41.9] Anxiety (Primary)  [R53.1] Weakness           Portions of this note were dictated using voice recognition software and may contain dictation related errors in spelling/grammar/syntax not found on text review          Los Bhatti Jr., MD  12/13/22 3225       Los Bhatti Jr., MD  12/13/22 4626       Los Bhatti Jr., MD  12/13/22 1603       Los Bhatti Jr., MD  12/14/22 8037

## 2022-12-14 NOTE — ED NOTES
Recd call from transfer center, spoke to [place]. Pt accepted at [facility]. Awaiting transport.

## 2022-12-14 NOTE — ED NOTES
Pt. Updated on status-awaiting transport to [facility]. Pt. States she feels anxious and is requesting Ativan.

## 2022-12-14 NOTE — ED NOTES
Pt request to call her brother. Pt given the opportunity to call brother. There wasn't an answer. Will tray again

## 2022-12-14 NOTE — ED NOTES
Care Handoff received from AB Rodriguez. Pt asleep awake when called her name. Pt is very pleasant during exam. Plan of care reviewed. Pt appears comfortable and in no distress.

## 2022-12-14 NOTE — CONSULTS
"[facility]  Psychiatry  Telepsychiatry Consult Note    Please see previous notes:    Patient agreeable to consultation via telepsychiatry.    Tele-Consultation from Psychiatry started: 12/14/2022 at 3:43am  The chief complaint leading to psychiatric consultation is: anxiety  This consultation was requested by Dr Bhatti, the Emergency Department attending physician.  The location of the consulting psychiatrist is  [place] .  The patient location is  [facility] EMERGENCY DEPARTMENT   The patient arrived at the ED at: [facility]    Also present with the patient at the time of the consultation:     Patient Identification:   Sherie Fernandez is a 67 y.o. female.    Patient information was obtained from patient and past medical records.  Patient presented voluntarily to the Emergency Department     Consults  Teleconsult Time Documentation  Subjective:     History of Present Illness:  66yo F with hx of anxiety    Per ED note- "Sherie Fernandez is a 67 y.o. female who  has a past medical history significant for anxiety chronic abdominal pain recurrent UTI tardive dyskinesia hypothyroidism, MDD with psychotic features and obsessive-compulsive disorder     The patient presents to the ED due to multiple complaints.  Patient is here primarily for anxiety.  She is here with her brother who lives with her.  They state that over the past couple of weeks her anxiety has become very severe with associated insomnia generalized weakness and fatigue.  Patient has had numerous evaluations by Psychiatry and admissions for similar symptoms however no one has been able to find out what is wrong.  She is in the process of filling out paperwork with her primary care physician for assisted living.  She was seen by her air physician on 12/08/2022 and was referred for outpatient CT for chronic abdominal pain Keflex for UTI.  Patient reports chronic abdominal pain which is unchanged today.  She denies any chest pain but reports trouble breathing associated " "with her anxiety.  No cough or fever reported.  "    On interview,  patient was quite anxious with halting speech. Patient reports she is having severe anxiety insomnia. Has not slept in one week. Reports thoughts of SI " I can't go on like this." Reports she is adherent with psych meds ambien, klonopin, and remeron..  Ran out of remeron 4-5 days ago. Anxiety got worse after that.   No HI  Denied psychotic sx aside from seeing "shadows"  No reeexperiencing sx noted  Some demoralization about her anxiety  Ongoing anhedonia.   This is the extent of patients complaints at this tie    Psychiatric History:   Previous Psychiatric Hospitalizations: Yes, last time was 4 years ago  Previous Medication Trials: Yes   Previous Suicide Attempts: no   History of Violence: no  History of Depression: no  History of Janine: no  History of Auditory/Visual Hallucination possible, seeing bright lights  History of Delusions: yes, ongoing  Outpatient psychiatrist (current & past): No     Substance Abuse History:  Tobacco:No  Alcohol: No  Illicit Substances:No  Detox/Rehab: No     Legal History: Past charges/incarcerations: No      Family Psychiatric History: uncle with unknown mental illness     Social History:  Disabled, lives with brother. NO guns on the house. 4 years of college     Psychiatric Mental Status Exam:  Arousal: alert  Sensorium/Orientation: oriented to grossly intact  Behavior/Cooperation: normal, cooperative, restless and fidgety    Speech: articulation error, delayed, increased latency of response  Language: grossly intact  Mood: "scared "   Affect: anxious  Thought Process: tangential  Thought Content:   Auditory hallucinations: NO  Visual hallucinations: YES:      Paranoia: YES:      Delusions:  YES:      Suicidal ideation: NO  Homicidal ideation: NO  Insight: has awareness of illness  Judgment: limited      Past Medical History: No past medical history on file.   Laboratory Data:   Labs Reviewed   CBC W/ AUTO " DIFFERENTIAL - Abnormal; Notable for the following components:       Result Value    RBC 3.90 (*)     Hemoglobin 10.9 (*)     Hematocrit 34.2 (*)     MCHC 31.9 (*)     MPV 8.8 (*)     Lymph # 0.8 (*)     Gran % 76.8 (*)     Lymph % 11.1 (*)     All other components within normal limits   COMPREHENSIVE METABOLIC PANEL - Abnormal; Notable for the following components:    CO2 19 (*)     ALT 7 (*)     eGFR 54 (*)     All other components within normal limits   URINALYSIS, REFLEX TO URINE CULTURE - Abnormal; Notable for the following components:    Protein, UA Trace (*)     Ketones, UA Trace (*)     Leukocytes, UA 1+ (*)     All other components within normal limits    Narrative:     Specimen Source->Urine   DRUG SCREEN PANEL, URINE EMERGENCY - Abnormal; Notable for the following components:    Benzodiazepines Presumptive Positive (*)     Phencyclidine Presumptive Positive (*)     All other components within normal limits    Narrative:     Specimen Source->Urine   ACETAMINOPHEN LEVEL - Abnormal; Notable for the following components:    Acetaminophen (Tylenol), Serum <3.0 (*)     All other components within normal limits   URINALYSIS MICROSCOPIC - Abnormal; Notable for the following components:    WBC, UA 6 (*)     All other components within normal limits    Narrative:     Specimen Source->Urine   TSH   ALCOHOL,MEDICAL (ETHANOL)   TROPONIN I   LIPASE   SARS-COV-2 RDRP GENE         Allergies:  Review of patient's allergies indicates:   Allergen Reactions    Lurasidone hcl     Olanzapine Other (See Comments)     Tardive dyskinesia, dystonia      Quetiapine      Other reaction(s): Tardive dyskinesia    Risperidone Other (See Comments)     Tardive dyskinesia.   Risperdal, Zyprexa and Latuda all caused Tardive Dyskinsia          Medications in ER:   Medications   LORazepam injection 1 mg (1 mg Intravenous Given 12/13/22 0113)       Medications at home:  No new subjective & objective note has been filed under this hospital  service since the last note was generated.      Assessment - Diagnosis - Goals:     Diagnosis/Impression:  TAM, OCD by hx    Rec:    Recommend PEC given risk of harm to self. Inpatient psychiatric tx once medically cleared.  Ativan 2mg IV/IM q 4 hours prn severe agitation or anxiety. Restart her outpatient remeron at 45mg po qhs. This is dose she indicated she ran out of several day ago.  Will defer to inpatient psychiatric team to start/modify scheduled medications        Time with patient, coordinating care: 25min      More than 50% of the time was spent counseling/coordinating care    Consulting clinician was informed of the encounter and consult note.    Consultation ended: 12/14/2022 at 4:14am    Bill Chavez MD  Psychiatry  [facility]

## 2022-12-14 NOTE — ED NOTES
PEC form scanned into the patients EMR. PEC has been called in and faxed to the [facility] coroners office.

## 2022-12-14 NOTE — ED NOTES
Patient spoke to her brother on phone and gave permission to update him. Updated on plan of care-transport to [facility], eta 1200noon. He states he will see her when she gets there and bring her some clothes and personal hygiene items.

## 2022-12-15 PROBLEM — N30.00 ACUTE CYSTITIS WITHOUT HEMATURIA: Status: ACTIVE | Noted: 2022-12-15

## 2022-12-15 PROBLEM — R26.81 UNSTEADY GAIT: Status: ACTIVE | Noted: 2022-12-15

## 2023-12-27 NOTE — PROGRESS NOTES
Assessment:       1. Chest pain, unspecified type              Plan:             Sherie was seen today for establish care and depression.    Diagnoses and all orders for this visit:    Chest pain, unspecified type      Acute  New Problem : high risk   Unclear Etiology  Unclear prognosis  Ddx would include  I explained to the patient the Potential injury or illness that could pose a threat to life or bodily function  I call and discussed case with ER staff about transfer   Patient is agreeable to plan         Subjective:       Patient ID: Sherie Fernandez is a 66 y.o. female.    Chief Complaint:   No chief complaint on file.      HPI    #Last visit/Previous Provider  This patient is new to me  Previously seen by Primary Doctor No  Last visit was  Last CPE was       Link to History           #Interim Hx    No urgent care or ED/hospitalization    #Concerns Today      #Chronic Problems    There is no problem list on file for this patient.    Patient report chest pain and shortness of breath         Health Maintenance Due   Topic Date Due    Hepatitis C Screening  Never done    Lipid Panel  Never done    TETANUS VACCINE  Never done    Mammogram  Never done    DEXA Scan  Never done    Colorectal Cancer Screening  Never done    Shingles Vaccine (1 of 2) Never done    Pneumococcal Vaccines (Age 65+) (1 - PCV) 11/10/2018    COVID-19 Vaccine (3 - Booster for Pfizer series) 12/01/2021       Health Maintenance Topics with due status: Not Due       Topic Last Completion Date    Influenza Vaccine 12/21/2013     BCa screen         Tobacco Use: Not on file               No results found for this or any previous visit.        No results found for: MVB94HHE, KUY83YSS, HPVOTHERPCR          Review of Systems   All other systems reviewed and are negative.      Objective:   There were no vitals taken for this visit.    Vitals 10/28/2020 10/27/2020   Weight (lbs) 150 150            Physical Exam  Nursing note reviewed.    Patient: Heath Rivera Date: 2023   : 1968    55 year old male      OUTPATIENT WOUND CARE PROGRESS NOTE    Supervising Wound Care / Hyperbaric Medicine Physician: Dr. Beltran Nina  Consulting Provider:  AUREA Mares  [facility] on 9/15/Constitutional:       General: She is in acute distress.      Appearance: Normal appearance. She is not toxic-appearing.   HENT:      Head: Normocephalic.   Eyes:      Conjunctiva/sclera: Conjunctivae normal.   Pulmonary:      Effort: Pulmonary effort is normal. No respiratory distress.   Neurological:      Mental Status: She is alert.      Comments: Tremors              ASCVD  The ASCVD Risk score (Greenbushdi MAY Jr., et al., 2013) failed to calculate for the following reasons:    Cannot find a previous HDL lab    Cannot find a previous total cholesterol lab    The smoking status is invalid    Basic Labs  BMP  Lab Results   Component Value Date     10/27/2020    K 3.2 (L) 10/27/2020     10/27/2020    CO2 25 10/27/2020    BUN 11 10/27/2020    CREATININE 1.2 10/27/2020    CALCIUM 10.0 10/27/2020    ANIONGAP 12 10/27/2020    ESTGFRAFRICA 54 (A) 10/27/2020    EGFRNONAA 47 (A) 10/27/2020     Lab Results   Component Value Date    ALT 11 10/27/2020    AST 18 10/27/2020    ALKPHOS 106 10/27/2020    BILITOT 0.7 10/27/2020       Lab Results   Component Value Date    TSH 1.305 10/28/2020       Lab Results   Component Value Date    WBC 7.95 10/27/2020    HGB 13.1 10/27/2020    HCT 37.9 10/27/2020    MCV 89 10/27/2020     10/27/2020           STD  No results found for: HIV1X2, [id number]  No results found for: RPR  No results found for: HAV, HEPAIGM, HEPBIGM, HEPBCAB, HBEAG, HEPCAB  No results found for: LABNGO, LABCHLA      Lipids  No results found for: CHOL  No results found for: HDL  No results found for: LDLCALC  No results found for: TRIG  No results found for: CHOLHDL    DM  No results found for: LABA1C, HGBA1C    PSA  No results found for: PSA, PSATOTAL, PSAFREE, PSAFREEPCT          No future appointments.            Disclaimer:  This note has been generated using voice-recognition software. There may be grammatical or spelling errors that have been missed during proof-reading      Tobacco abuse     Vitamin D deficiency      Past Surgical History:   Procedure Laterality Date    Appendectomy  07/23/2011    ruptured appendix w/peritonitis    Cardiac catherization      Cardiac surgery  07/21/2023    S/p OPCAB x 4  LIMA -> D1 -> LAD and CINTHIA (free, as Y-graft off of LIMA) -> M1 and SVG -> PDA, bilateral internal mammary artery harvest    Carpal tunnel release Right 2001    Right     Carpal tunnel release Left 2001    Left      Social History     Socioeconomic History    Marital status: Single     Spouse name: Not on file    Number of children: 0    Years of education: Not on file    Highest education level: Not on file   Occupational History    Not on file   Tobacco Use    Smoking status: Former     Types: Cigarettes    Smokeless tobacco: Never   Vaping Use    Vaping Use: Every day    Substances: Nicotine   Substance and Sexual Activity    Alcohol use: Not Currently     Comment: 1x month    Drug use: Yes     Types: Prescription Drugs    Sexual activity: Not Currently   Other Topics Concern    Not on file   Social History Narrative    Not on file     Social Determinants of Health     Financial Resource Strain: Low Risk  (9/15/2023)    Financial Resource Strain     Unable to Get: None   Food Insecurity: Not At Risk (9/15/2023)    Food Insecurity     Food Insecurity: Worried or Stressed about Money for Food: Never   Transportation Needs: Not At Risk (9/15/2023)    PRAPARE - Transportation     Lack of Transportation (Medical): No     Lack of Transportation (Non-Medical): No   Physical Activity: Not on file   Stress: Not on file   Social Connections: Low Risk  (9/15/2023)    Social Connections     Social Connectivity: 3 to 5 times a week   Interpersonal Safety: Not At Risk (9/15/2023)    Interpersonal Safety     Social Determinants: Intimate Partner Violence Past Fear: No     Social Determinants: Intimate Partner Violence Current Fear: No     Family History   Problem Relation Age of Onset    Heart  disease Mother     Cancer, Throat Mother     Osteoarthritis Mother     Myocardial Infarction Father 48    Coronary Artery Disease Father     Heart disease Sister     Patient is unaware of any medical problems Sister     Cancer, Throat Brother      Current Outpatient Medications   Medication Sig    ciprofloxacin (CIPRO) 500 MG tablet Take 1 tablet by mouth in the morning and 1 tablet in the evening. Do all this for 7 days.    nirmatrelvir & ritonavir 300mg/100mg (PAXLOVID) 20 x 150 MG & 10 x 100MG Take 300 mg nirmatrelvir (two 150 mg tablets) with 100 mg ritonavir (one 100 mg tablet), with all three tablets taken together by mouth twice daily for 5 days.    ondansetron (ZOFRAN) 4 MG tablet Take 1 tablet by mouth every 8 hours as needed for Nausea.    methimazole (TAPAZOLE) 10 MG tablet Take 1 tablet by mouth daily.    nabumetone (RELAFEN) 750 MG tablet TAKE 1 TABLET BY MOUTH IN THE MORNING AND IN THE EVENING    methotrexate (RHEUMATREX) 2.5 MG tablet Take 8 tablets by mouth 1 day a week. Takes Wednesday    magnesium oxide (MAG-OX) 400 (240 Mg) MG tablet TAKE 1 TABLET BY MOUTH DAILY    glimepiride (AMARYL) 1 MG tablet Take 1 tablet by mouth daily (before breakfast).    omeprazole (PriLOSEC) 40 MG capsule Take 1 capsule by mouth daily.    gabapentin (NEURONTIN) 300 MG capsule Take 2 capsules by mouth in the morning and 2 capsules at noon and 2 capsules in the evening.    HYDROcodone-acetaminophen (NORCO) 5-325 MG per tablet Take 1 tablet by mouth 2 times daily as needed for Pain.    albuterol 108 (90 Base) MCG/ACT inhaler Inhale 2 puffs into the lungs every 4 hours as needed for Wheezing.    promethazine-dextromethorphan (PROMETHAZINE-DM) 6.25-15 MG/5ML syrup Take 5 mLs by mouth 4 times daily as needed for Cough.    ferrous sulfate 325 (65 FE) MG tablet Take 1 tablet by mouth daily (with breakfast).    furosemide (LASIX) 20 MG tablet Take 1 tablet by mouth daily.    potassium CHLORIDE (KLOR-CON M) 20 MEQ brant ER tablet  Take 1 tablet by mouth daily. Do not take alone if stopping lasix for any reason. Further refills per cardiology.    metoPROLOL succinate (TOPROL-XL) 100 MG 24 hr tablet Take 1 tablet by mouth in the morning and 1 tablet in the evening.    cetirizine (ZyrTEC) 10 MG tablet Take 1 tablet by mouth daily. Indications: Hayfever    fluticasone-umeclidin-vilanterol (TRELEGY ELLIPTA) 100-62.5-25 MCG/ACT inhaler Inhale 1 puff into the lungs daily.    albuterol-ipratropium (COMBIVENT RESPIMAT) 100-20 MCG/ACT inhaler Inhale 1 puff into the lungs every 4 hours as needed for Shortness of Breath.    Enbrel SureClick 50 MG/ML auto-injector injection Inject 1 mL into the skin 1 day a week.    dulaglutide (Trulicity) 0.75 MG/0.5ML pen-injector Inject 0.75 mg into the skin every 7 days.    clopidogrel (PLAVIX) 75 MG tablet Take 1 tablet by mouth daily.    aspirin 81 MG chewable tablet Chew 1 tablet by mouth daily.    alfuzosin (UROXATRAL) 10 MG 24 hr tablet Take 1 tablet by mouth daily.    sulfaSALAzine (AZULFIDINE) 500 MG tablet Take 2 tablets by mouth in the morning and 2 tablets in the evening.    predniSONE (DELTASONE) 5 MG tablet Take 1 tablet by mouth daily.    folic acid (FOLATE) 1 MG tablet Take 1 tablet by mouth daily.    guaiFENesin (Mucinex) 600 MG 12 hr tablet Take 1 tablet by mouth in the morning and 1 tablet in the evening.    atorvastatin (LIPITOR) 40 MG tablet Take 1 tablet by mouth daily.    blood glucose meter Test blood sugar 3 times daily. Diagnosis: E11.65. Meter: per insurance    blood glucose test strip Test blood sugar 3 times daily. Diagnosis: E11.65. Meter: per insurance    blood glucose lancets Test blood sugar 3 times daily. Diagnosis: E11.65. Meter: per insurance    naLOXone (NARCAN) 4 MG/0.1ML nasal liquid Spray the content of 1 device into 1 nostril. Call 911. May repeat with 2nd device in alternate nostril if no response in 2-3 minutes.    Acetaminophen (TYLENOL PO) Take 650 mg by mouth daily as needed  (mild pain). Do not start before July 29, 2023.    DULoxetine (CYMBALTA) 60 MG capsule Take 1 capsule by mouth daily.    albuterol 108 (90 Base) MCG/ACT inhaler Inhale 2 puffs into the lungs every 6 hours as needed for Shortness of Breath or Wheezing. Please dispense a spacer    True Metrix Blood Glucose Test test strip TEST BLOOD SUGAR ONE TIMES DAILY AS DIRECTED. DIAGNOSIS: E11.9. METER: E11.9    [employer] Lancets Ultra-Thin 30G Misc TEST BLOOD SUGAR ONE TIMES DAILY AS DIRECTED. DIAGNOSIS: E11.9. METER: PHARMACIST CHOICE     Current Facility-Administered Medications   Medication    lidocaine 2% urethral (UROJET) 2 % jelly 10 mL    silver nitrate topical stick 1 applicator      ALLERGIES:  Mold   (environmental), Sitagliptin, Azithromycin, Dander, Leflunomide, Pseudoephedrine, and Seasonal    OBJECTIVE:  Vital Signs:    Visit Vitals  /67 (BP Location: RUE - Right upper extremity, Patient Position: Sitting)   Pulse 77   Temp 97 °F (36.1 °C) (Temporal)   Resp 18       Physical Exam:  General appearance: Alert, oriented to person, place, time and situation, in no distress and cooperative  Head:   normocephalic without obvious abnormality  Pulmonary: normal respiratory effort     Plantar foot full-thickness open wound. Wound with heavy macerated callus. Nearly circumferential undermining noted. Callus trimmed with pick-ups and scissors. Wound base debrided with #3 curette to pinpoint bleeding. Wound with burden of necrotic tissue post-debridement.   Left foot with increased edema and mild erythema     12/27/23 12/4/23 pre-debridement       11/27/23       Wound Bed Quality:  Granulation tissue and Fibrin      Anaya-wound Quality:    Callus    Additional Descriptors:  drainage, moderate to heavy drainage noted today     Wound Measurements Per Flowsheet:       Wound Sternum (Active)   Number of days: 103       Wound Foot Left Plantar (Active)   Wound Length (cm) 3 cm 12/27/23 1414   Wound Width (cm) 6.9 cm 12/27/23  with patient today. Refrain from walking on left foot. Use an wheelchair, walker, scooter or offloading forefront shoe to prevent applying pressure to wounds.   Patient needs to offload wound area as much as possible in order to allow the healing process to occur more efficiently.      7/14/23 - Discussed improved off-loading  Discussed numerous options for improved off-loading including:  Knee scooter  Heel wedge   Crutches   New PEG assist insert   Total contact cast     Will try to obtain new offloading shoe during admission. Would ultimately benefit from TCC as outpatient and  custom shoes and inserts for long term offlaoding.   Instructed him to wear his off-loading shoe at all times     8/18/23 - Again discussed TCC. He does not want to pursue this at this time. Order sent for diabetic shoes/inserts and toe spacer to [facility]. Encouraged him to use a urinal to avoid ambulating to the bathroom without shoes.   9/11/23 - Patient again asked about TCC here at [facility]. At this time, we do not have TCC at this site. Offered to partner with [place] for cardiac rehab and TCC in [place]. Continue Peg assist shoe at all times   9/27/23 - His wounds are deteriorating in his DH shoe with pegs removed. Encouraged him to resume his forefoot off- (Luis heel wedge). Encouraged him to pursue his diabetic foot inserts and toe spacers from [facility] ASAP. Agreeable to total contact casting in [place] or [place] if they can accommodate him   11/1/23 - Begin EZ cast the week of 11/13/23 if MRI negative for improved off-loading   11/15/23 & 11/20/23 - Hold EZ cast due to drainage.   12/27/23 - Continue current off-loading. Was not able to off-load as well due to recent COVID-19 infection with associated nausea, vomiting and diarrhea     Diabetes   Recommend tight diabetic control. Goal is for blood glucose <150.  Hemoglobin A1C (%)   Date Value   11/13/2023 6.1 (H)     This is  at goal. Recommend  Lucy Dill NP  [facility]  [phone]  Available via secure chat Monday, Wednesday and Friday [phone] to [phone]    CC: Dr. York, Dr. Shipley & Dr. Kirby

## 2024-01-30 ENCOUNTER — HOSPITAL ENCOUNTER (OUTPATIENT)
Facility: HOSPITAL | Age: 69
Discharge: HOME | End: 2024-02-01
Attending: EMERGENCY MEDICINE | Admitting: EMERGENCY MEDICINE
Payer: MEDICAID

## 2024-01-30 DIAGNOSIS — R53.1 WEAKNESS: ICD-10-CM

## 2024-01-30 DIAGNOSIS — R53.81 DEBILITY: ICD-10-CM

## 2024-01-30 DIAGNOSIS — F33.3 SEVERE EPISODE OF RECURRENT MAJOR DEPRESSIVE DISORDER, WITH PSYCHOTIC FEATURES: ICD-10-CM

## 2024-01-30 DIAGNOSIS — R26.81 UNSTEADY GAIT: Primary | ICD-10-CM

## 2024-01-30 DIAGNOSIS — R10.10 UPPER ABDOMINAL PAIN: ICD-10-CM

## 2024-01-30 DIAGNOSIS — R07.9 CHEST PAIN: ICD-10-CM

## 2024-01-30 LAB
ALBUMIN SERPL BCP-MCNC: 3.2 G/DL (ref 3.5–5.2)
ALP SERPL-CCNC: 115 U/L (ref 55–135)
ALT SERPL W/O P-5'-P-CCNC: 19 U/L (ref 10–44)
ANION GAP SERPL CALC-SCNC: 9 MMOL/L (ref 8–16)
AST SERPL-CCNC: 20 U/L (ref 10–40)
BASOPHILS # BLD AUTO: 0.07 K/UL (ref 0–0.2)
BASOPHILS NFR BLD: 1.1 % (ref 0–1.9)
BILIRUB SERPL-MCNC: 0.3 MG/DL (ref 0.1–1)
BUN SERPL-MCNC: 13 MG/DL (ref 8–23)
CALCIUM SERPL-MCNC: 9 MG/DL (ref 8.7–10.5)
CHLORIDE SERPL-SCNC: 112 MMOL/L (ref 95–110)
CO2 SERPL-SCNC: 23 MMOL/L (ref 23–29)
CREAT SERPL-MCNC: 0.9 MG/DL (ref 0.5–1.4)
DIFFERENTIAL METHOD BLD: ABNORMAL
EOSINOPHIL # BLD AUTO: 0.2 K/UL (ref 0–0.5)
EOSINOPHIL NFR BLD: 3.9 % (ref 0–8)
ERYTHROCYTE [DISTWIDTH] IN BLOOD BY AUTOMATED COUNT: 14.4 % (ref 11.5–14.5)
EST. GFR  (NO RACE VARIABLE): >60 ML/MIN/1.73 M^2
GLUCOSE SERPL-MCNC: 88 MG/DL (ref 70–110)
HCT VFR BLD AUTO: 35 % (ref 37–48.5)
HCV AB SERPL QL IA: NORMAL
HGB BLD-MCNC: 11.4 G/DL (ref 12–16)
HIV 1+2 AB+HIV1 P24 AG SERPL QL IA: NORMAL
IMM GRANULOCYTES # BLD AUTO: 0.02 K/UL (ref 0–0.04)
IMM GRANULOCYTES NFR BLD AUTO: 0.3 % (ref 0–0.5)
LYMPHOCYTES # BLD AUTO: 1.1 K/UL (ref 1–4.8)
LYMPHOCYTES NFR BLD: 18.2 % (ref 18–48)
MAGNESIUM SERPL-MCNC: 1.8 MG/DL (ref 1.6–2.6)
MCH RBC QN AUTO: 31.8 PG (ref 27–31)
MCHC RBC AUTO-ENTMCNC: 32.6 G/DL (ref 32–36)
MCV RBC AUTO: 98 FL (ref 82–98)
MONOCYTES # BLD AUTO: 0.7 K/UL (ref 0.3–1)
MONOCYTES NFR BLD: 10.9 % (ref 4–15)
NEUTROPHILS # BLD AUTO: 4.1 K/UL (ref 1.8–7.7)
NEUTROPHILS NFR BLD: 65.6 % (ref 38–73)
NRBC BLD-RTO: 0 /100 WBC
PLATELET # BLD AUTO: 196 K/UL (ref 150–450)
PMV BLD AUTO: 10.2 FL (ref 9.2–12.9)
POTASSIUM SERPL-SCNC: 3.5 MMOL/L (ref 3.5–5.1)
PROT SERPL-MCNC: 5.8 G/DL (ref 6–8.4)
RBC # BLD AUTO: 3.59 M/UL (ref 4–5.4)
SODIUM SERPL-SCNC: 144 MMOL/L (ref 136–145)
TROPONIN I SERPL DL<=0.01 NG/ML-MCNC: <0.006 NG/ML (ref 0–0.03)
TSH SERPL DL<=0.005 MIU/L-ACNC: 1.4 UIU/ML (ref 0.4–4)
WBC # BLD AUTO: 6.22 K/UL (ref 3.9–12.7)

## 2024-01-30 PROCEDURE — 84484 ASSAY OF TROPONIN QUANT: CPT | Performed by: EMERGENCY MEDICINE

## 2024-01-30 PROCEDURE — 83735 ASSAY OF MAGNESIUM: CPT | Performed by: EMERGENCY MEDICINE

## 2024-01-30 PROCEDURE — 84443 ASSAY THYROID STIM HORMONE: CPT | Performed by: EMERGENCY MEDICINE

## 2024-01-30 PROCEDURE — 93005 ELECTROCARDIOGRAM TRACING: CPT

## 2024-01-30 PROCEDURE — 93010 ELECTROCARDIOGRAM REPORT: CPT | Mod: ,,, | Performed by: INTERNAL MEDICINE

## 2024-01-30 PROCEDURE — 85025 COMPLETE CBC W/AUTO DIFF WBC: CPT | Performed by: EMERGENCY MEDICINE

## 2024-01-30 PROCEDURE — 80053 COMPREHEN METABOLIC PANEL: CPT | Performed by: EMERGENCY MEDICINE

## 2024-01-30 PROCEDURE — 99285 EMERGENCY DEPT VISIT HI MDM: CPT | Mod: 25

## 2024-01-30 PROCEDURE — 87389 HIV-1 AG W/HIV-1&-2 AB AG IA: CPT | Performed by: PHYSICIAN ASSISTANT

## 2024-01-30 PROCEDURE — 86803 HEPATITIS C AB TEST: CPT | Performed by: PHYSICIAN ASSISTANT

## 2024-01-30 RX ORDER — LORAZEPAM 0.5 MG/1
0.5 TABLET ORAL
Status: COMPLETED | OUTPATIENT
Start: 2024-01-30 | End: 2024-01-31

## 2024-01-30 RX ORDER — ACETAMINOPHEN 500 MG
1000 TABLET ORAL
Status: COMPLETED | OUTPATIENT
Start: 2024-01-30 | End: 2024-01-31

## 2024-01-31 LAB
BACTERIA #/AREA URNS AUTO: NORMAL /HPF
BILIRUB UR QL STRIP: NEGATIVE
CAOX CRY UR QL COMP ASSIST: NORMAL
CLARITY UR REFRACT.AUTO: CLEAR
COLOR UR AUTO: YELLOW
GLUCOSE UR QL STRIP: NEGATIVE
HGB UR QL STRIP: NEGATIVE
HYALINE CASTS UR QL AUTO: 0 /LPF
KETONES UR QL STRIP: NEGATIVE
LEUKOCYTE ESTERASE UR QL STRIP: NEGATIVE
MICROSCOPIC COMMENT: NORMAL
NITRITE UR QL STRIP: NEGATIVE
PH UR STRIP: 6 [PH] (ref 5–8)
PROT UR QL STRIP: ABNORMAL
RBC #/AREA URNS AUTO: 1 /HPF (ref 0–4)
SP GR UR STRIP: >1.03 (ref 1–1.03)
SQUAMOUS #/AREA URNS AUTO: 0 /HPF
URN SPEC COLLECT METH UR: ABNORMAL
WBC #/AREA URNS AUTO: 0 /HPF (ref 0–5)

## 2024-01-31 PROCEDURE — 96361 HYDRATE IV INFUSION ADD-ON: CPT

## 2024-01-31 PROCEDURE — 25000003 PHARM REV CODE 250: Performed by: PHYSICIAN ASSISTANT

## 2024-01-31 PROCEDURE — G0378 HOSPITAL OBSERVATION PER HR: HCPCS

## 2024-01-31 PROCEDURE — 81001 URINALYSIS AUTO W/SCOPE: CPT | Performed by: EMERGENCY MEDICINE

## 2024-01-31 PROCEDURE — 97161 PT EVAL LOW COMPLEX 20 MIN: CPT

## 2024-01-31 PROCEDURE — 97165 OT EVAL LOW COMPLEX 30 MIN: CPT

## 2024-01-31 PROCEDURE — 25000003 PHARM REV CODE 250: Performed by: EMERGENCY MEDICINE

## 2024-01-31 PROCEDURE — 25000003 PHARM REV CODE 250

## 2024-01-31 PROCEDURE — 96374 THER/PROPH/DIAG INJ IV PUSH: CPT

## 2024-01-31 PROCEDURE — 97530 THERAPEUTIC ACTIVITIES: CPT

## 2024-01-31 PROCEDURE — 63600175 PHARM REV CODE 636 W HCPCS: Performed by: EMERGENCY MEDICINE

## 2024-01-31 PROCEDURE — 97116 GAIT TRAINING THERAPY: CPT

## 2024-01-31 RX ORDER — IBUPROFEN 200 MG
24 TABLET ORAL
Status: DISCONTINUED | OUTPATIENT
Start: 2024-01-31 | End: 2024-02-01 | Stop reason: HOSPADM

## 2024-01-31 RX ORDER — MIRTAZAPINE 30 MG/1
30 TABLET, FILM COATED ORAL
COMMUNITY
Start: 2024-01-23 | End: 2024-02-01

## 2024-01-31 RX ORDER — ACETAMINOPHEN 500 MG
1000 TABLET ORAL EVERY 8 HOURS
Status: DISCONTINUED | OUTPATIENT
Start: 2024-01-31 | End: 2024-02-01 | Stop reason: HOSPADM

## 2024-01-31 RX ORDER — MIRTAZAPINE 15 MG/1
30 TABLET, FILM COATED ORAL NIGHTLY
Status: DISCONTINUED | OUTPATIENT
Start: 2024-01-31 | End: 2024-02-01 | Stop reason: HOSPADM

## 2024-01-31 RX ORDER — GLUCAGON 1 MG
1 KIT INJECTION
Status: DISCONTINUED | OUTPATIENT
Start: 2024-01-31 | End: 2024-02-01 | Stop reason: HOSPADM

## 2024-01-31 RX ORDER — LORAZEPAM 2 MG/ML
1 INJECTION INTRAMUSCULAR
Status: COMPLETED | OUTPATIENT
Start: 2024-01-31 | End: 2024-01-31

## 2024-01-31 RX ORDER — OLANZAPINE 2.5 MG/1
2.5 TABLET ORAL NIGHTLY
COMMUNITY
Start: 2024-01-23 | End: 2024-02-01

## 2024-01-31 RX ORDER — NAPROXEN SODIUM 220 MG/1
81 TABLET, FILM COATED ORAL DAILY
Status: DISCONTINUED | OUTPATIENT
Start: 2024-02-01 | End: 2024-01-31

## 2024-01-31 RX ORDER — ATORVASTATIN CALCIUM 80 MG/1
80 TABLET, FILM COATED ORAL NIGHTLY
COMMUNITY
Start: 2024-01-23

## 2024-01-31 RX ORDER — LEVOTHYROXINE SODIUM 25 UG/1
25 TABLET ORAL
Status: DISCONTINUED | OUTPATIENT
Start: 2024-02-01 | End: 2024-02-01 | Stop reason: HOSPADM

## 2024-01-31 RX ORDER — FLUOXETINE HYDROCHLORIDE 40 MG/1
40 CAPSULE ORAL 2 TIMES DAILY
COMMUNITY
Start: 2024-01-23 | End: 2024-02-01

## 2024-01-31 RX ORDER — BISACODYL 10 MG/1
10 SUPPOSITORY RECTAL DAILY PRN
Status: DISCONTINUED | OUTPATIENT
Start: 2024-01-31 | End: 2024-02-01 | Stop reason: HOSPADM

## 2024-01-31 RX ORDER — ATORVASTATIN CALCIUM 40 MG/1
80 TABLET, FILM COATED ORAL NIGHTLY
Status: DISCONTINUED | OUTPATIENT
Start: 2024-01-31 | End: 2024-02-01 | Stop reason: HOSPADM

## 2024-01-31 RX ORDER — HYDROXYZINE PAMOATE 25 MG/1
25 CAPSULE ORAL EVERY 6 HOURS PRN
COMMUNITY
Start: 2024-01-23 | End: 2024-02-01

## 2024-01-31 RX ORDER — FLUOXETINE HYDROCHLORIDE 20 MG/1
40 CAPSULE ORAL DAILY
Status: DISCONTINUED | OUTPATIENT
Start: 2024-01-31 | End: 2024-02-01 | Stop reason: HOSPADM

## 2024-01-31 RX ORDER — PROCHLORPERAZINE EDISYLATE 5 MG/ML
5 INJECTION INTRAMUSCULAR; INTRAVENOUS EVERY 6 HOURS PRN
Status: DISCONTINUED | OUTPATIENT
Start: 2024-01-31 | End: 2024-02-01 | Stop reason: HOSPADM

## 2024-01-31 RX ORDER — ACETAMINOPHEN 325 MG/1
650 TABLET ORAL EVERY 6 HOURS PRN
Status: DISPENSED | OUTPATIENT
Start: 2024-01-31 | End: 2024-02-01

## 2024-01-31 RX ORDER — OXCARBAZEPINE 150 MG/1
300 TABLET, FILM COATED ORAL 2 TIMES DAILY
Status: DISCONTINUED | OUTPATIENT
Start: 2024-01-31 | End: 2024-02-01 | Stop reason: HOSPADM

## 2024-01-31 RX ORDER — PROPRANOLOL HYDROCHLORIDE 10 MG/1
10 TABLET ORAL 3 TIMES DAILY
Status: DISCONTINUED | OUTPATIENT
Start: 2024-01-31 | End: 2024-02-01 | Stop reason: HOSPADM

## 2024-01-31 RX ORDER — OXCARBAZEPINE 300 MG/1
300 TABLET, FILM COATED ORAL 2 TIMES DAILY
COMMUNITY
Start: 2024-01-23 | End: 2024-02-01

## 2024-01-31 RX ORDER — LEVOMEFOLATE MAGNESIUM, LEUCOVORIN, FOLIC ACID, FERROUS CYSTEINE GLYCINATE, MAGNESIUM ASCORBATE, ZINC ASCORBATE, COCARBOXYLASE, FLAVIN ADENINE DINUCLEOTIDE, NADH, PYRIDOXAL PHOSPHATE ANHYDROUS, COBAMAMIDE, BETAINE, MAGNESIUM L-THREONATE, 1,2-DOCOSAHEXANOYL-SN-GLYCERO-3-PHOSPHOSERINE CALCIUM, 1,2-ICOSAPENTOYL-SN-GLYCERO-3-PHOSPHOSERINE CALCIUM, AND PHOSPHATIDYL SERINE 5.23; 2.5; 1; 13.6; 24; 1; 25; 25; 25; 25; 50; 500; 1; 6.4; 800; 12 MG/1; MG/1; MG/1; MG/1; MG/1; MG/1; UG/1; UG/1; UG/1; UG/1; UG/1; UG/1; MG/1; MG/1; UG/1; MG/1
1 CAPSULE, DELAYED RELEASE PELLETS ORAL DAILY
COMMUNITY
Start: 2024-01-23

## 2024-01-31 RX ORDER — NALOXONE HCL 0.4 MG/ML
0.02 VIAL (ML) INJECTION
Status: DISCONTINUED | OUTPATIENT
Start: 2024-01-31 | End: 2024-02-01 | Stop reason: HOSPADM

## 2024-01-31 RX ORDER — NAPROXEN SODIUM 220 MG/1
81 TABLET, FILM COATED ORAL DAILY
Status: DISCONTINUED | OUTPATIENT
Start: 2024-01-31 | End: 2024-02-01 | Stop reason: HOSPADM

## 2024-01-31 RX ORDER — ONDANSETRON 4 MG/1
4 TABLET, ORALLY DISINTEGRATING ORAL EVERY 8 HOURS PRN
Status: DISCONTINUED | OUTPATIENT
Start: 2024-01-31 | End: 2024-02-01 | Stop reason: HOSPADM

## 2024-01-31 RX ORDER — SODIUM CHLORIDE 0.9 % (FLUSH) 0.9 %
5 SYRINGE (ML) INJECTION
Status: DISCONTINUED | OUTPATIENT
Start: 2024-01-31 | End: 2024-02-01 | Stop reason: HOSPADM

## 2024-01-31 RX ORDER — SIMETHICONE 80 MG
1 TABLET,CHEWABLE ORAL 4 TIMES DAILY PRN
Status: DISCONTINUED | OUTPATIENT
Start: 2024-01-31 | End: 2024-02-01 | Stop reason: HOSPADM

## 2024-01-31 RX ORDER — ALUMINUM HYDROXIDE, MAGNESIUM HYDROXIDE, AND SIMETHICONE 1200; 120; 1200 MG/30ML; MG/30ML; MG/30ML
30 SUSPENSION ORAL 4 TIMES DAILY PRN
Status: DISCONTINUED | OUTPATIENT
Start: 2024-01-31 | End: 2024-02-01 | Stop reason: HOSPADM

## 2024-01-31 RX ORDER — IPRATROPIUM BROMIDE AND ALBUTEROL SULFATE 2.5; .5 MG/3ML; MG/3ML
3 SOLUTION RESPIRATORY (INHALATION) EVERY 4 HOURS PRN
Status: DISCONTINUED | OUTPATIENT
Start: 2024-01-31 | End: 2024-02-01 | Stop reason: HOSPADM

## 2024-01-31 RX ORDER — VIBEGRON 75 MG/1
1 TABLET, FILM COATED ORAL DAILY
COMMUNITY
End: 2024-02-01

## 2024-01-31 RX ORDER — MIRTAZAPINE 7.5 MG/1
15 TABLET, FILM COATED ORAL DAILY
COMMUNITY
End: 2024-02-01

## 2024-01-31 RX ORDER — GABAPENTIN 300 MG/1
600 CAPSULE ORAL 3 TIMES DAILY
COMMUNITY
Start: 2024-01-23 | End: 2024-02-01

## 2024-01-31 RX ORDER — POLYETHYLENE GLYCOL 3350 17 G/17G
17 POWDER, FOR SOLUTION ORAL 2 TIMES DAILY PRN
Status: DISCONTINUED | OUTPATIENT
Start: 2024-01-31 | End: 2024-02-01 | Stop reason: HOSPADM

## 2024-01-31 RX ORDER — IBUPROFEN 200 MG
16 TABLET ORAL
Status: DISCONTINUED | OUTPATIENT
Start: 2024-01-31 | End: 2024-02-01 | Stop reason: HOSPADM

## 2024-01-31 RX ORDER — TALC
6 POWDER (GRAM) TOPICAL NIGHTLY PRN
Status: DISCONTINUED | OUTPATIENT
Start: 2024-01-31 | End: 2024-02-01 | Stop reason: HOSPADM

## 2024-01-31 RX ADMIN — ALUMINUM HYDROXIDE, MAGNESIUM HYDROXIDE, AND SIMETHICONE 30 ML: 200; 200; 20 SUSPENSION ORAL at 05:01

## 2024-01-31 RX ADMIN — LORAZEPAM 1 MG: 2 INJECTION INTRAMUSCULAR; INTRAVENOUS at 04:01

## 2024-01-31 RX ADMIN — ACETAMINOPHEN 1000 MG: 500 TABLET ORAL at 12:01

## 2024-01-31 RX ADMIN — PROPRANOLOL HYDROCHLORIDE 10 MG: 10 TABLET ORAL at 08:01

## 2024-01-31 RX ADMIN — MIRTAZAPINE 30 MG: 15 TABLET, FILM COATED ORAL at 08:01

## 2024-01-31 RX ADMIN — VALBENAZINE 80 MG: 40 CAPSULE ORAL at 05:01

## 2024-01-31 RX ADMIN — OXCARBAZEPINE 300 MG: 150 TABLET, FILM COATED ORAL at 08:01

## 2024-01-31 RX ADMIN — FLUOXETINE HYDROCHLORIDE 40 MG: 20 CAPSULE ORAL at 03:01

## 2024-01-31 RX ADMIN — ACETAMINOPHEN 650 MG: 325 TABLET ORAL at 05:01

## 2024-01-31 RX ADMIN — SODIUM CHLORIDE 500 ML: 9 INJECTION, SOLUTION INTRAVENOUS at 12:01

## 2024-01-31 RX ADMIN — LORAZEPAM 0.5 MG: 0.5 TABLET ORAL at 12:01

## 2024-01-31 RX ADMIN — ASPIRIN 81 MG CHEWABLE TABLET 81 MG: 81 TABLET CHEWABLE at 04:01

## 2024-01-31 RX ADMIN — ATORVASTATIN CALCIUM 80 MG: 40 TABLET, FILM COATED ORAL at 08:01

## 2024-01-31 RX ADMIN — PROPRANOLOL HYDROCHLORIDE 10 MG: 10 TABLET ORAL at 03:01
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
## 2024-01-31 NOTE — PROGRESS NOTES
ED Observation Unit  Progress Note      HPI   Pleasant 68-year-old female multiple medical problems including targeted dyskinesia, hypertension, CVA, unsteady gait presents the ER for evaluation of weakness and falls.  Patient endorse has been falling multiple times last few days, unsure if hit to head.  She endorses she is feeling weak with worsening tremors.  She is complaining of right hip pain.  She lives in a group home and reports they advised her to come the ER for further evaluation. Of note, patient reports that she was recently hospitalized at an inpatient psychiatric unit and reports that she was discharged from there on 1/22. She reports that most of her current medications are new and were started during her hospitalization.   She denies CP, SOB, fever, chills, nausea, vomiting, or diarrhea.    In the ED: mildly hypertensive otherwise vital signs stable, afebrile. Intake labs largely unremarkable. Hb 11.4, improved from recent baseline. Electrolytes within normal limits. CTH and CT spine without acute findings. CXR negative for acute process. XR R hip negative for fracture or dislocation. Given ativan 1mg inj x2, 1g tylenol, and 500 mL NS.    Interval History   Resting in bed. Ambulates to restroom independently. Awaiting psychiatry recommendations.     PMHx   History reviewed. No pertinent past medical history.   History reviewed. No pertinent surgical history.     Family Hx   History reviewed. No pertinent family history.     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        Brief Physical Exam/Reassessment   Physical Exam  Constitutional:       Appearance: Normal appearance.   HENT:      Head: Normocephalic and atraumatic.   Cardiovascular:      Rate and Rhythm: Normal rate and regular rhythm.   Pulmonary:      Effort: Pulmonary effort is normal. No respiratory distress.   Abdominal:      General: Abdomen is flat.      Palpations: Abdomen is soft.   Musculoskeletal:         General: Normal range of motion.   Skin:     General: Skin is warm and dry.   Neurological:      Mental Status: She is alert and oriented to person, place, and time. Mental status is at baseline.      Comments: RUE tremor   Psychiatric:      Comments: Speech delayed       Labs/Imaging   Labs Reviewed   CBC W/ AUTO DIFFERENTIAL - Abnormal; Notable for the following components:       Result Value    RBC 3.59 (*)     Hemoglobin 11.4 (*)     Hematocrit 35.0 (*)     MCH 31.8 (*)     All other components within normal limits   COMPREHENSIVE METABOLIC PANEL - Abnormal; Notable for the following components:    Chloride 112 (*)     Total Protein 5.8 (*)     Albumin 3.2 (*)     All other components within normal limits   URINALYSIS, REFLEX TO URINE CULTURE - Abnormal; Notable for the following components:    Specific Gravity, UA >1.030 (*)     Protein, UA 1+ (*)     All other components within normal limits    Narrative:     Specimen Source->Urine   HIV 1 / 2 ANTIBODY    Narrative:     Release to patient->Immediate   HEPATITIS C ANTIBODY "    Narrative:     Release to patient->Immediate   MAGNESIUM   TROPONIN I   TSH   URINALYSIS MICROSCOPIC    Narrative:     Specimen Source->Urine      Imaging Results              CT Head Without Contrast (Final result)  Result time 01/31/24 04:22:46      Final result by Mino Fox MD (01/31/24 04:22:46)                   Impression:      1. No CT evidence of acute intracranial abnormality.  Remote appearing infarct in the left parietotemporal lobe.  2. No acute fracture in the cervical spine.    Electronically signed by resident: Laurel Moreno  Date:    01/31/2024  Time:    03:44    Electronically signed by: Mino Fox MD  Date:    01/31/2024  Time:    04:22               Narrative:    EXAMINATION:  CT HEAD WITHOUT CONTRAST; CT CERVICAL SPINE WITHOUT CONTRAST    CLINICAL HISTORY:  Head trauma, minor (Age >= 65y);; Neck trauma (Age >= 65y);    TECHNIQUE:  Low dose axial CT images obtained throughout the head without the use of intravenous contrast.  Axial, sagittal and coronal reconstructions were performed.    COMPARISON:  None.    FINDINGS:  CT head:    Remote appearing infarct involving the left parietotemporal lobe.    No parenchymal mass, hemorrhage, edema or major vascular distribution infarct.    Ventricles and sulci are normal in size for age without evidence of hydrocephalus.    No extra-axial blood or fluid collections.    Skull/extracranial contents (limited evaluation):    No displaced calvarial fractures.  Paranasal sinuses and mastoid air cells are essentially clear.    CT cervical spine:    Alignment: Straightening of the normal cervical lordosis, which could be positional or related to muscular strain.  Grossly normal sagittal alignment.    Vertebrae: No fracture.    Discs: Multilevel disc height loss most severe at C5-C6.    C1-2: Dens is intact.  Pre-dens space is maintained.    Skull base and craniocervical junction: Normal.    Degenerative findings:    Multilevel cervical spine  spondylosis with posterior disc osteophyte complexes, uncovertebral joint spurring and facet arthropathy, most advanced at C5-C6 resulting in mild canal stenosis and bilateral mild neural foraminal narrowing.    The soft tissue structures visualized in the neck are unremarkable.    The airway is patent and the lung apices are unremarkable.                                       CT Cervical Spine Without Contrast (Final result)  Result time 01/31/24 04:22:46      Final result by Mino Fox MD (01/31/24 04:22:46)                   Impression:      1. No CT evidence of acute intracranial abnormality.  Remote appearing infarct in the left parietotemporal lobe.  2. No acute fracture in the cervical spine.    Electronically signed by resident: Laurel Moreno  Date:    01/31/2024  Time:    03:44    Electronically signed by: Mino Fox MD  Date:    01/31/2024  Time:    04:22               Narrative:    EXAMINATION:  CT HEAD WITHOUT CONTRAST; CT CERVICAL SPINE WITHOUT CONTRAST    CLINICAL HISTORY:  Head trauma, minor (Age >= 65y);; Neck trauma (Age >= 65y);    TECHNIQUE:  Low dose axial CT images obtained throughout the head without the use of intravenous contrast.  Axial, sagittal and coronal reconstructions were performed.    COMPARISON:  None.    FINDINGS:  CT head:    Remote appearing infarct involving the left parietotemporal lobe.    No parenchymal mass, hemorrhage, edema or major vascular distribution infarct.    Ventricles and sulci are normal in size for age without evidence of hydrocephalus.    No extra-axial blood or fluid collections.    Skull/extracranial contents (limited evaluation):    No displaced calvarial fractures.  Paranasal sinuses and mastoid air cells are essentially clear.    CT cervical spine:    Alignment: Straightening of the normal cervical lordosis, which could be positional or related to muscular strain.  Grossly normal sagittal alignment.    Vertebrae: No fracture.    Discs:  "Multilevel disc height loss most severe at C5-C6.    C1-2: Dens is intact.  Pre-dens space is maintained.    Skull base and craniocervical junction: Normal.    Degenerative findings:    Multilevel cervical spine spondylosis with posterior disc osteophyte complexes, uncovertebral joint spurring and facet arthropathy, most advanced at C5-C6 resulting in mild canal stenosis and bilateral mild neural foraminal narrowing.    The soft tissue structures visualized in the neck are unremarkable.    The airway is patent and the lung apices are unremarkable.                                       X-Ray Chest AP Portable (Final result)  Result time 01/31/24 00:24:53      Final result by Mino Fox MD (01/31/24 00:24:53)                   Impression:      No obvious acute abnormality identified on this hypoventilatory limited single view.    Hiatal hernia.      Electronically signed by: Mino Fox MD  Date:    01/31/2024  Time:    00:24               Narrative:    EXAMINATION:  XR CHEST AP PORTABLE    CLINICAL HISTORY:  Provided history is "  Weakness".    TECHNIQUE:  One view of the chest.    COMPARISON:  12/13/2022.    FINDINGS:  Cardiac wires overlie the chest.  Lung volumes are low, accentuating basilar markings.  Cardiomediastinal silhouette is magnified by portable technique and low lung volumes, potentially mildly enlarged.  Hiatal hernia noted.  Coarse bibasilar interstitial lung markings but no large focal area of consolidation.  No large pleural effusion.  No pneumothorax.                                       X-Ray Hip 2 or 3 views Right (with Pelvis when performed) (Final result)  Result time 01/31/24 00:23:40      Final result by Mino Fox MD (01/31/24 00:23:40)                   Impression:      No acute displaced fracture.      Electronically signed by: Mino Fox MD  Date:    01/31/2024  Time:    00:23               Narrative:    EXAMINATION:  XR HIP WITH PELVIS WHEN PERFORMED, 2 OR 3  " VIEWS RIGHT    CLINICAL HISTORY:  fall;    TECHNIQUE:  AP view of the pelvis and frog leg lateral view of the right hip were performed.    COMPARISON:  None    FINDINGS:  Bones are mildly demineralized.  No acute displaced fracture.  No dislocation.  No advanced degenerative changes.                                       I reviewed all labs, imaging, EKGs.     Plan   Frequent falls  Orthostatic hypotension  Polypharmacy   - VSSAF  - intake labs largely unremarkable  - CTH and cervical spine negative  - XR R hip negative for fracture or dislocation  - orthostatics slightly positive; laying (174/77) --> standing (152/78), will give 1L NS and reassess  - concern for polypharmacy s/p recent psychiatric hospitalization with several medications changes, consulted pharmD for med reconciliation and psychiatry for assistance  - medications brought by the patient include: Oxcarbazepine 300 mg BID, fluoxetine 40 mg daily, atorvastatin 80 mg daily, propranolol 10 mg TID, hydroxyzine 25 mg q6 hours (patient reports taking q6 hours), synthroid 25 mcg, Ingrezza 40 mg daily and 60 mg qhs (non-formulary medication ordered), gabapentin 300 mg TID prn, mirtazapine 7.5 mg daily and 30 mg qhs, and olanzapine 2.5 mg qhs  - holding gabapentin, olanzapine and hydroxyzine  - PT/OT consulted and recommend moderate intensity, however, patient refuses SNF placement and insurance would be a barrier for post-acute placement  - CM/SW assistance with return to group home at d/c and HH initiation vs. OP PT/OT with transport  - fall precautions     I have discussed this case with GARY Bustos.

## 2024-01-31 NOTE — PLAN OF CARE
01/31/24 1215   Post-Acute Status   Post-Acute Authorization Home Health   Home Health Status Referrals Sent   Discharge Delays None known at this time   Discharge Plan   Discharge Plan A Home Health     SW contacted [facility] and spoke with Ely.  They are in network with pt insurance and asked the referral be sent via [facility].      SW contacted  Zaid [phone] and informed him of home health and pt d/c on 02/01      Mona Romero, ANGEL, MSW, LMSW, RSW   Case Management  [facility]  Email: [email or web address]

## 2024-01-31 NOTE — PLAN OF CARE
Problem: Physical Therapy  Goal: Physical Therapy Goal  Description: PT goals to be met by: 2/29/24    Patient will perform supine <> sitting with supervision.  Patient will perform sit <> stand transitions with supervision using RW.  Patient will perform transfers from bed <> chair or BSC with supervision using RW.  Patient will ambulate 100 feet with supervision using RW.  Outcome: Ongoing, Progressing

## 2024-01-31 NOTE — PROVIDER PROGRESS NOTES - EMERGENCY DEPT.
Encounter Date: 1/30/2024    ED Physician Progress Notes        ED Physician Hand-off Note:    ED Course: I assumed care of patient from off-going ED physician team. Briefly, Patient is a 68-year-old female presenting from a group home for tremors and frequent falling.    At the time of signout plan was pending -UA, PT/OT evaluation.    Medications given in the ED:    Medications   acetaminophen tablet 650 mg (650 mg Oral Given 2/1/24 0932)   sodium chloride 0.9% bolus 500 mL 500 mL (0 mLs Intravenous Stopped 1/31/24 0100)   acetaminophen tablet 1,000 mg (1,000 mg Oral Given 1/31/24 0026)   LORazepam tablet 0.5 mg (0.5 mg Oral Given 1/31/24 0026)   LORazepam injection 1 mg (1 mg Intravenous Given 1/31/24 0442)   aluminum-magnesium hydroxide-simethicone 200-200-20 mg/5 mL suspension 5 mL (5 mLs Oral Given 2/1/24 0933)     Imaging Results              CT Head Without Contrast (Final result)  Result time 01/31/24 04:22:46      Final result by Mino Fox MD (01/31/24 04:22:46)                   Impression:      1. No CT evidence of acute intracranial abnormality.  Remote appearing infarct in the left parietotemporal lobe.  2. No acute fracture in the cervical spine.    Electronically signed by resident: Laurel Moreno  Date:    01/31/2024  Time:    03:44    Electronically signed by: Mino Fox MD  Date:    01/31/2024  Time:    04:22               Narrative:    EXAMINATION:  CT HEAD WITHOUT CONTRAST; CT CERVICAL SPINE WITHOUT CONTRAST    CLINICAL HISTORY:  Head trauma, minor (Age >= 65y);; Neck trauma (Age >= 65y);    TECHNIQUE:  Low dose axial CT images obtained throughout the head without the use of intravenous contrast.  Axial, sagittal and coronal reconstructions were performed.    COMPARISON:  None.    FINDINGS:  CT head:    Remote appearing infarct involving the left parietotemporal lobe.    No parenchymal mass, hemorrhage, edema or major vascular distribution infarct.    Ventricles and sulci are  normal in size for age without evidence of hydrocephalus.    No extra-axial blood or fluid collections.    Skull/extracranial contents (limited evaluation):    No displaced calvarial fractures.  Paranasal sinuses and mastoid air cells are essentially clear.    CT cervical spine:    Alignment: Straightening of the normal cervical lordosis, which could be positional or related to muscular strain.  Grossly normal sagittal alignment.    Vertebrae: No fracture.    Discs: Multilevel disc height loss most severe at C5-C6.    C1-2: Dens is intact.  Pre-dens space is maintained.    Skull base and craniocervical junction: Normal.    Degenerative findings:    Multilevel cervical spine spondylosis with posterior disc osteophyte complexes, uncovertebral joint spurring and facet arthropathy, most advanced at C5-C6 resulting in mild canal stenosis and bilateral mild neural foraminal narrowing.    The soft tissue structures visualized in the neck are unremarkable.    The airway is patent and the lung apices are unremarkable.                                       CT Cervical Spine Without Contrast (Final result)  Result time 01/31/24 04:22:46      Final result by Mino Fox MD (01/31/24 04:22:46)                   Impression:      1. No CT evidence of acute intracranial abnormality.  Remote appearing infarct in the left parietotemporal lobe.  2. No acute fracture in the cervical spine.    Electronically signed by resident: Laurel Moreno  Date:    01/31/2024  Time:    03:44    Electronically signed by: Mino Fox MD  Date:    01/31/2024  Time:    04:22               Narrative:    EXAMINATION:  CT HEAD WITHOUT CONTRAST; CT CERVICAL SPINE WITHOUT CONTRAST    CLINICAL HISTORY:  Head trauma, minor (Age >= 65y);; Neck trauma (Age >= 65y);    TECHNIQUE:  Low dose axial CT images obtained throughout the head without the use of intravenous contrast.  Axial, sagittal and coronal reconstructions were  "performed.    COMPARISON:  None.    FINDINGS:  CT head:    Remote appearing infarct involving the left parietotemporal lobe.    No parenchymal mass, hemorrhage, edema or major vascular distribution infarct.    Ventricles and sulci are normal in size for age without evidence of hydrocephalus.    No extra-axial blood or fluid collections.    Skull/extracranial contents (limited evaluation):    No displaced calvarial fractures.  Paranasal sinuses and mastoid air cells are essentially clear.    CT cervical spine:    Alignment: Straightening of the normal cervical lordosis, which could be positional or related to muscular strain.  Grossly normal sagittal alignment.    Vertebrae: No fracture.    Discs: Multilevel disc height loss most severe at C5-C6.    C1-2: Dens is intact.  Pre-dens space is maintained.    Skull base and craniocervical junction: Normal.    Degenerative findings:    Multilevel cervical spine spondylosis with posterior disc osteophyte complexes, uncovertebral joint spurring and facet arthropathy, most advanced at C5-C6 resulting in mild canal stenosis and bilateral mild neural foraminal narrowing.    The soft tissue structures visualized in the neck are unremarkable.    The airway is patent and the lung apices are unremarkable.                                       X-Ray Chest AP Portable (Final result)  Result time 01/31/24 00:24:53      Final result by Mino Fox MD (01/31/24 00:24:53)                   Impression:      No obvious acute abnormality identified on this hypoventilatory limited single view.    Hiatal hernia.      Electronically signed by: Mino Fox MD  Date:    01/31/2024  Time:    00:24               Narrative:    EXAMINATION:  XR CHEST AP PORTABLE    CLINICAL HISTORY:  Provided history is "  Weakness".    TECHNIQUE:  One view of the chest.    COMPARISON:  12/13/2022.    FINDINGS:  Cardiac wires overlie the chest.  Lung volumes are low, accentuating basilar markings.  " Cardiomediastinal silhouette is magnified by portable technique and low lung volumes, potentially mildly enlarged.  Hiatal hernia noted.  Coarse bibasilar interstitial lung markings but no large focal area of consolidation.  No large pleural effusion.  No pneumothorax.                                       X-Ray Hip 2 or 3 views Right (with Pelvis when performed) (Final result)  Result time 01/31/24 00:23:40      Final result by Mino Fox MD (01/31/24 00:23:40)                   Impression:      No acute displaced fracture.      Electronically signed by: Mino Fox MD  Date:    01/31/2024  Time:    00:23               Narrative:    EXAMINATION:  XR HIP WITH PELVIS WHEN PERFORMED, 2 OR 3  VIEWS RIGHT    CLINICAL HISTORY:  fall;    TECHNIQUE:  AP view of the pelvis and frog leg lateral view of the right hip were performed.    COMPARISON:  None    FINDINGS:  Bones are mildly demineralized.  No acute displaced fracture.  No dislocation.  No advanced degenerative changes.                                    11:45 AM  After multi member team- HM, CM, discussion patient is cleared to go back to the group home    Disposition: to group home    Patient comfortable with plan for discharge. Patient counseled regarding exam, results, diagnosis, treatment, and plan.    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

## 2024-01-31 NOTE — PLAN OF CARE
HERMAN completed the LOCET via phone. SW faxed PASRR to obtain the 142 for NH admission.    HERMAN was told that pt has a current active LOCET that expires 02/05 at the end of business day but to fax the current PASSR to obtain current LOCET      SW scanned PASSR and emailed it to Radha Silva at [email or web address]      Mona Romero CD, MSW, LMSW, RSW   Case Management  [facility]  Email: [email or web address]

## 2024-01-31 NOTE — H&P
ED Observation Unit  History and Physical      I assumed care of this patient from the Main ED at onset of observation time, on 01/31/2024.       History of Present Illness:    Pleasant 68-year-old female multiple medical problems including targeted dyskinesia, hypertension, CVA, unsteady gait presents the ER for evaluation of weakness and falls.  Patient endorse has been falling multiple times last few days, unsure if hit to head.  She endorses she is feeling weak with worsening tremors.  She is complaining of right hip pain.  She lives in a group home and reports they advised her to come the ER for further evaluation. Of note, patient reports that she was recently hospitalized at an inpatient psychiatric unit and reports that she was discharged from there on 1/22. She reports that most of her current medications are new and were started during her hospitalization.   She denies CP, SOB, fever, chills, nausea, vomiting, or diarrhea.    In the ED: mildly hypertensive otherwise vital signs stable, afebrile. Intake labs largely unremarkable. Hb 11.4, improved from recent baseline. Electrolytes within normal limits. CTH and CT spine without acute findings. CXR negative for acute process. XR R hip negative for fracture or dislocation. Given ativan 1mg inj x2, 1g tylenol, and 500 mL NS.    I reviewed the ED Provider Note dated 1/31/24 prior to my evaluation of this patient.  I reviewed all labs and imaging performed in the Main ED, prior to patient being placed in Observation. Patient was placed in the ED Observation Unit for frequent falls.    PMHx   History reviewed. No pertinent past medical history.   History reviewed. No pertinent surgical history.     Family Hx   History reviewed. No pertinent family history.     Social Hx   Social History     Socioeconomic History    Marital status: Single   Tobacco Use    Smoking status: Never    Smokeless tobacco: Never   Substance and Sexual Activity    Alcohol use: Never     Drug use: Never   Other Topics Concern    Patient feels they ought to cut down on drinking/drug use No    Patient annoyed by others criticizing their drinking/drug use No    Patient has felt bad or guilty about drinking/drug use No    Patient has had a drink/used drugs as an eye opener in the AM No        Vital Signs   Vitals:    01/31/24 1048 01/31/24 1114 01/31/24 1115 01/31/24 1116   BP: (!) 143/69 (S) (!) 151/84 (S) (!) 174/77 (S) (!) 152/78   BP Location:  Left arm Right arm Right arm   Patient Position:  (S) Lying (S) Sitting (S) Standing   Pulse: 66 67 68 71   Resp:  16 16 16   Temp:       TempSrc:       SpO2: 95% 95% 95% 97%   Weight:            Review of Systems  Review of Systems   Constitutional:  Negative for chills, diaphoresis, fever and malaise/fatigue.   HENT:  Negative for sore throat.    Eyes:  Negative for double vision.   Respiratory:  Negative for cough, shortness of breath and wheezing.    Cardiovascular:  Negative for chest pain, palpitations, orthopnea, leg swelling and PND.   Gastrointestinal:  Negative for abdominal pain, blood in stool, constipation, heartburn, nausea and vomiting.   Genitourinary:  Negative for hematuria.   Musculoskeletal:  Positive for falls and myalgias (R hip).   Neurological:  Positive for tremors (R arm resting tremor). Negative for dizziness, loss of consciousness, weakness and headaches.   Psychiatric/Behavioral:  Negative for hallucinations. The patient is nervous/anxious.        Physical Exam  Physical Exam  HENT:      Head: Normocephalic and atraumatic.      Nose: Nose normal.   Eyes:      Extraocular Movements: Extraocular movements intact.      Comments: R eyelid closed   Cardiovascular:      Rate and Rhythm: Normal rate and regular rhythm.   Pulmonary:      Effort: Pulmonary effort is normal.      Breath sounds: Normal breath sounds.   Abdominal:      General: Abdomen is flat.   Musculoskeletal:         General: Normal range of motion.      Cervical back:  Normal range of motion.      Comments: ROM intact in bilateral upper and lower extremities, RLE ROM slightly limited due to pain   Skin:     General: Skin is warm.   Neurological:      General: No focal deficit present.      Mental Status: She is alert and oriented to person, place, and time. Mental status is at baseline.      Motor: Tremor present.      Comments: RUE resting tremor       Assessment/Plan:  Frequent falls  Orthostatic hypotension  Polypharmacy   - VSSAF  - intake labs largely unremarkable  - CTH and cervical spine negative  - XR R hip negative for fracture or dislocation  - orthostatics slightly positive; laying (174/77) --> standing (152/78), will give 1L NS and reassess  - concern for polypharmacy s/p recent psychiatric hospitalization with several medications changes, consulted pharmD for med reconciliation and psychiatry for assistance  - medications brought by the patient include: Oxcarbazepine 300 mg BID, fluoxetine 40 mg daily, atorvastatin 80 mg daily, propranolol 10 mg TID, hydroxyzine 25 mg q6 hours (patient reports taking q6 hours), synthroid 25 mcg, Ingrezza 40 mg daily and 60 mg qhs (non-formulary medication ordered), gabapentin 300 mg TID prn, mirtazapine 7.5 mg daily and 30 mg qhs, and olanzapine 2.5 mg qhs  - holding gabapentin, olanzapine and hydroxyzine  - PT/OT consulted and recommend moderate intensity, however, patient refuses SNF placement and insurance would be a barrier for post-acute placement  - CM/SW assistance with return to group home at d/c and HH initiation vs. OP PT/OT with transport  - fall precautions    Case was discussed with the ED provider, Dr. Peoples.

## 2024-01-31 NOTE — ED NOTES
Pt ambulated with assistance to toilet and instructed not to get up w/o assistance and to use call light when finished.

## 2024-01-31 NOTE — PLAN OF CARE
SW spoke with  Zaid Leonard [phone] and as per Zaid pt lives at [facility] on [address] [facility] [address], [address].  As per Zaid pt cannot return if she is not independent as she has been bed bound the last couple of days and has had to have assistance with basic ADL's.  As per Zaid she was placed in the home by an ACT team and her  is Sheng Ng [phone].  As per Zaid the group home is not equipped to take care of residents who are not independent with their ADL's.      HERMAN contacted Sheng  and asked about the plan for the pt.  As per Sheng they tried to place pt last year into a nursing home but she was denied.  Sheng asked if HERMAN could assist with long term placement and HERMAN stated that she could start the process for placement but that pt would need a place to d/c to.  Sheng stated she would find a place for pt to d/c to and contact HERMAN back      Mona Romero, ANGEL, MSW, LMSW, RSW   Case Management  [facility]  Email: [email or web address]

## 2024-01-31 NOTE — PT/OT/SLP EVAL
Occupational Therapy   Co-Evaluation & Co-Treatment    Name: Sherie Fernandez  MRN: [id number]  Admitting Diagnosis: <principal problem not specified>      Recommendations:     Discharge Recommendations: Moderate Intensity Therapy  Discharge Equipment Recommendations:  walker, rolling  Barriers to discharge:  Decreased caregiver support    Assessment:     Sherie Fernandez is a 68 y.o. female with a medical diagnosis of <principal problem not specified>.  She presents with performance deficits affecting function: weakness, impaired endurance, impaired self care skills, impaired functional mobility, gait instability, impaired balance, pain, decreased lower extremity function, impaired fine motor, impaired coordination.    Pt tolerated eval well. Pt was previously staying in a new group home PTA. Performed bed mobility, STS, transfers, and steps today with CGA-Min A. Pt states she has tardive dyskinesia and OCD that affects functional performance. Pt will continue to benefit from skilled OT services to address impairments listed above to maximize independence with ADLs and functional mobility to ensure safe return to PLOF.     Rehab Prognosis: Good; patient would benefit from acute skilled OT services to address these deficits and reach maximum level of function.       Plan:     Patient to be seen 3 x/week to address the above listed problems via self-care/home management, therapeutic activities, therapeutic groups, neuromuscular re-education  Plan of Care Expires: 03/01/24  Plan of Care Reviewed with: patient    Subjective     Chief Complaint: losing balance  Patient/Family Comments/goals: get better    Occupational Profile:  Living Environment: Pt recently moved into a group home (5 days ago). Tub/shower   Previous level of function: Ind with ADLs and functional mobility, but reports that now requires some assistance with dressing  Roles and Routines: not working, R handed  Equipment Used at Home: none but group home has a shower  chair on site if needed  Assistance upon Discharge: none    Pain/Comfort:  Pain Rating 1:  (unrated)  Location - Side 1: Left  Location - Orientation 1: lower  Location 1:  (pelvis/bladder)  Pain Addressed 1: Reposition, Distraction  Pain Rating Post-Intervention 1:  (unrated)    Patients cultural, spiritual, Sikhism conflicts given the current situation: no    Objective:     Communicated with: RN prior to session.  Patient found HOB elevated with telemetry, pulse ox (continuous), peripheral IV, PureWick, blood pressure cuff upon OT entry to room.    General Precautions: Standard, fall  Orthopedic Precautions: N/A  Braces: N/A  Respiratory Status: Room air    Occupational Performance:    Bed Mobility:    Patient completed Rolling/Turning to Left with  contact guard assistance  Patient completed Scooting anteriorly towards EOB with minimum assistance  Patient completed Supine to Sit with minimum assistance  Patient completed Sit to Supine with moderate assistance    Functional Mobility/Transfers:  Patient completed Sit <> Stand Transfer with minimum assistance  with  no assistive device   Functional Mobility: Pt took some steps forward and backwards and side to side with Min A and HHA for balance    Activities of Daily Living:  Lower Body Dressing: minimum assistance pulling up pants     Cognitive/Visual Perceptual:  Cognitive/Psychosocial Skills:     -       Oriented to: Person, Place, Time, Situation, and some confusion, perseveration noted   -       Follows Commands/attention:Easily distracted and Follows one-step commands  -       Safety awareness/insight to disability: intact   -       Mood/Affect/Coping skills/emotional control: Anxious    Physical Exam:  Dominant hand: -       R  Upper Extremity Range of Motion:     -       Right Upper Extremity: WFL  -       Left Upper Extremity: WFL  Upper Extremity Strength: -       Right Upper Extremity: 4/5  -       Left Upper Extremity: 4/5   Strength: -        Right Upper Extremity: WFL  -       Left Upper Extremity: WFL  Fine Motor Coordination:    -       Impaired  Left hand, finger to nose , Right hand, finger to nose increased time required with eyes closed, Left hand thumb/finger opposition skills increased time required with eyes closed, inaccurate, and Right hand thumb/finger opposition skills 2/2 tardive dyskinesia     AMPAC 6 Click ADL:  AMPAC Total Score: 17    Treatment & Education:  Pt educated on   Role of OT and OT POC  Safe transfer techniques and proper body mechanics for fall prevention and improved independence with functional transfers  Importance of OOB activities to increase endurance and tolerance for increased participation in daily ADLs    Utilizing the call bell to request for assistance with all functional mobility to ensure safety during hospital stay.    Pt verbalized understanding and all questions were addressed within the scope of OT.     Patient left HOB elevated with all lines intact and call button in reach    GOALS:   Multidisciplinary Problems       Occupational Therapy Goals          Problem: Occupational Therapy    Goal Priority Disciplines Outcome Interventions   Occupational Therapy Goal     OT, PT/OT Ongoing, Progressing    Description: Goals to be met by: 2/7/24     Patient will increase functional independence with ADLs by performing:    UE Dressing with Standby Assistance.  LE Dressing with Standby Assistance.  Grooming while standing at sink with Stand-by Assistance.  Toileting from toilet with Contact Guard Assistance for hygiene and clothing management.   Step transfer with Supervision  Toilet transfer to toilet with Supervision.                         History:     History reviewed. No pertinent past medical history.    History reviewed. No pertinent surgical history.    Time Tracking:     OT Date of Treatment: 01/31/24  OT Start Time: 0830  OT Stop Time: 0854  OT Total Time (min): 24 min    Billable Minutes:Evaluation  10  Therapeutic Activity 14    1/31/2024

## 2024-01-31 NOTE — ED TRIAGE NOTES
Sherie Fernandez, a 68 y.o. female presents to the ED w/ complaint of fatigue. Pt stated she have been feeling very fatigued for several weeks. Pt stated she fell 4x yesterday. Pt reports she fell once in the bathroom and 3x in the bedroom. Pt endorses right hip pain. Pt stated her pain is a 7/10.     Triage note:  Chief Complaint   Patient presents with    Fatigue     Weakness x 3 weeks, hx of tremors, reports they are getting worse     Review of patient's allergies indicates:   Allergen Reactions    Lurasidone hcl     Olanzapine Other (See Comments)     Tardive dyskinesia, dystonia      Quetiapine      Other reaction(s): Tardive dyskinesia    Risperidone Other (See Comments)     Tardive dyskinesia.   Risperdal, Zyprexa and Latuda all caused Tardive Dyskinsia        No past medical history on file.

## 2024-01-31 NOTE — ED PROVIDER NOTES
Encounter Date: 1/30/2024       History     Chief Complaint   Patient presents with    Fatigue     Weakness x 3 weeks, hx of tremors, reports they are getting worse     HPI    Pleasant 68-year-old female multiple medical problems including targeted dyskinesia, hypertension, CVA, unsteady gait presents the ER for evaluation of weakness and falls.  Patient endorse has been falling multiple times last few days, unsure if hit to head.  She endorses she is feeling weak with worsening tremors.  She is complaining of right hip pain.  She lives in a group home and reports they advised her to come the ER for further evaluation.    Review of patient's allergies indicates:   Allergen Reactions    Lurasidone hcl     Olanzapine Other (See Comments)     Tardive dyskinesia, dystonia      Quetiapine      Other reaction(s): Tardive dyskinesia    Risperidone Other (See Comments)     Tardive dyskinesia.   Risperdal, Zyprexa and Latuda all caused Tardive Dyskinsia        History reviewed. No pertinent past medical history.  History reviewed. No pertinent surgical history.  History reviewed. No pertinent family history.  Social History     Tobacco Use    Smoking status: Never    Smokeless tobacco: Never   Substance Use Topics    Alcohol use: Never    Drug use: Never     Review of Systems   Constitutional:  Positive for fatigue.   Musculoskeletal:  Positive for myalgias.   All other systems reviewed and are negative.      Physical Exam     Initial Vitals [01/30/24 1929]   BP Pulse Resp Temp SpO2   111/62 65 16 98.1 °F (36.7 °C) 97 %      MAP       --         Physical Exam    Nursing note and vitals reviewed.  Constitutional:   Elderly, chronically ill-appearing no distress   HENT:   Head: Normocephalic and atraumatic.   Eyes: EOM are normal. Pupils are equal, round, and reactive to light.   Neck:   Normal range of motion.  Cardiovascular:  Normal rate and regular rhythm.           Pulmonary/Chest: No respiratory distress.   Abdominal:  Abdomen is soft. She exhibits no distension. There is no abdominal tenderness.   Musculoskeletal:      Cervical back: Normal range of motion.      Comments: Reproducible right hip tenderness no major deformities noted     Neurological: She is alert and oriented to person, place, and time.   Baseline tremors noted   Skin: Skin is warm and dry.   Psychiatric: She has a normal mood and affect. Thought content normal.         ED Course   Procedures  Labs Reviewed   CBC W/ AUTO DIFFERENTIAL - Abnormal; Notable for the following components:       Result Value    RBC 3.59 (*)     Hemoglobin 11.4 (*)     Hematocrit 35.0 (*)     MCH 31.8 (*)     All other components within normal limits   COMPREHENSIVE METABOLIC PANEL - Abnormal; Notable for the following components:    Chloride 112 (*)     Total Protein 5.8 (*)     Albumin 3.2 (*)     All other components within normal limits   URINALYSIS, REFLEX TO URINE CULTURE - Abnormal; Notable for the following components:    Specific Gravity, UA >1.030 (*)     Protein, UA 1+ (*)     All other components within normal limits    Narrative:     Specimen Source->Urine   HIV 1 / 2 ANTIBODY    Narrative:     Release to patient->Immediate   HEPATITIS C ANTIBODY    Narrative:     Release to patient->Immediate   MAGNESIUM   TROPONIN I   TSH   URINALYSIS MICROSCOPIC    Narrative:     Specimen Source->Urine     EKG Readings: (Independently Interpreted)   Normal sinus rhythm 61 beats per minute, no acute ST elevations no STEMI     ECG Results              EKG 12-lead (Final result)  Result time 01/31/24 08:40:50      Final result by Interface, Lab In [facility] (01/31/24 08:40:50)                   Narrative:    Test Reason : R53.1,    Vent. Rate : 061 BPM     Atrial Rate : 061 BPM     P-R Int : 130 ms          QRS Dur : 078 ms      QT Int : 458 ms       P-R-T Axes : 046 -24 040 degrees     QTc Int : 461 ms    Normal sinus rhythm  Cannot rule out Anterior infarct (cited on or before 13-DEC-2022)vs  lead  placement  Abnormal ECG  When compared with ECG of 13-DEC-2022 23:51,  Vent. rate has decreased BY  70 BPM  Confirmed by Cal JESSICA MD ([id number]) on 1/31/2024 8:40:39 AM    Referred By: [email or web address]   SELF           Confirmed By:Cal JESSICA MD                                  Imaging Results              CT Head Without Contrast (Final result)  Result time 01/31/24 04:22:46      Final result by Mino Fox MD (01/31/24 04:22:46)                   Impression:      1. No CT evidence of acute intracranial abnormality.  Remote appearing infarct in the left parietotemporal lobe.  2. No acute fracture in the cervical spine.    Electronically signed by resident: Laurel Moreno  Date:    01/31/2024  Time:    03:44    Electronically signed by: Mino Fox MD  Date:    01/31/2024  Time:    04:22               Narrative:    EXAMINATION:  CT HEAD WITHOUT CONTRAST; CT CERVICAL SPINE WITHOUT CONTRAST    CLINICAL HISTORY:  Head trauma, minor (Age >= 65y);; Neck trauma (Age >= 65y);    TECHNIQUE:  Low dose axial CT images obtained throughout the head without the use of intravenous contrast.  Axial, sagittal and coronal reconstructions were performed.    COMPARISON:  None.    FINDINGS:  CT head:    Remote appearing infarct involving the left parietotemporal lobe.    No parenchymal mass, hemorrhage, edema or major vascular distribution infarct.    Ventricles and sulci are normal in size for age without evidence of hydrocephalus.    No extra-axial blood or fluid collections.    Skull/extracranial contents (limited evaluation):    No displaced calvarial fractures.  Paranasal sinuses and mastoid air cells are essentially clear.    CT cervical spine:    Alignment: Straightening of the normal cervical lordosis, which could be positional or related to muscular strain.  Grossly normal sagittal alignment.    Vertebrae: No fracture.    Discs: Multilevel disc height loss most severe at C5-C6.    C1-2: Dens is intact.  Pre-dens space  is maintained.    Skull base and craniocervical junction: Normal.    Degenerative findings:    Multilevel cervical spine spondylosis with posterior disc osteophyte complexes, uncovertebral joint spurring and facet arthropathy, most advanced at C5-C6 resulting in mild canal stenosis and bilateral mild neural foraminal narrowing.    The soft tissue structures visualized in the neck are unremarkable.    The airway is patent and the lung apices are unremarkable.                                       CT Cervical Spine Without Contrast (Final result)  Result time 01/31/24 04:22:46      Final result by Mino Fox MD (01/31/24 04:22:46)                   Impression:      1. No CT evidence of acute intracranial abnormality.  Remote appearing infarct in the left parietotemporal lobe.  2. No acute fracture in the cervical spine.    Electronically signed by resident: Laurel Moreno  Date:    01/31/2024  Time:    03:44    Electronically signed by: Mino Fox MD  Date:    01/31/2024  Time:    04:22               Narrative:    EXAMINATION:  CT HEAD WITHOUT CONTRAST; CT CERVICAL SPINE WITHOUT CONTRAST    CLINICAL HISTORY:  Head trauma, minor (Age >= 65y);; Neck trauma (Age >= 65y);    TECHNIQUE:  Low dose axial CT images obtained throughout the head without the use of intravenous contrast.  Axial, sagittal and coronal reconstructions were performed.    COMPARISON:  None.    FINDINGS:  CT head:    Remote appearing infarct involving the left parietotemporal lobe.    No parenchymal mass, hemorrhage, edema or major vascular distribution infarct.    Ventricles and sulci are normal in size for age without evidence of hydrocephalus.    No extra-axial blood or fluid collections.    Skull/extracranial contents (limited evaluation):    No displaced calvarial fractures.  Paranasal sinuses and mastoid air cells are essentially clear.    CT cervical spine:    Alignment: Straightening of the normal cervical lordosis, which could be  "positional or related to muscular strain.  Grossly normal sagittal alignment.    Vertebrae: No fracture.    Discs: Multilevel disc height loss most severe at C5-C6.    C1-2: Dens is intact.  Pre-dens space is maintained.    Skull base and craniocervical junction: Normal.    Degenerative findings:    Multilevel cervical spine spondylosis with posterior disc osteophyte complexes, uncovertebral joint spurring and facet arthropathy, most advanced at C5-C6 resulting in mild canal stenosis and bilateral mild neural foraminal narrowing.    The soft tissue structures visualized in the neck are unremarkable.    The airway is patent and the lung apices are unremarkable.                                       X-Ray Chest AP Portable (Final result)  Result time 01/31/24 00:24:53      Final result by Mino Fox MD (01/31/24 00:24:53)                   Impression:      No obvious acute abnormality identified on this hypoventilatory limited single view.    Hiatal hernia.      Electronically signed by: Mino Fox MD  Date:    01/31/2024  Time:    00:24               Narrative:    EXAMINATION:  XR CHEST AP PORTABLE    CLINICAL HISTORY:  Provided history is "  Weakness".    TECHNIQUE:  One view of the chest.    COMPARISON:  12/13/2022.    FINDINGS:  Cardiac wires overlie the chest.  Lung volumes are low, accentuating basilar markings.  Cardiomediastinal silhouette is magnified by portable technique and low lung volumes, potentially mildly enlarged.  Hiatal hernia noted.  Coarse bibasilar interstitial lung markings but no large focal area of consolidation.  No large pleural effusion.  No pneumothorax.                                       X-Ray Hip 2 or 3 views Right (with Pelvis when performed) (Final result)  Result time 01/31/24 00:23:40      Final result by Mino Fox MD (01/31/24 00:23:40)                   Impression:      No acute displaced fracture.      Electronically signed by: Mino Fox, " MD  Date:    01/31/2024  Time:    00:23               Narrative:    EXAMINATION:  XR HIP WITH PELVIS WHEN PERFORMED, 2 OR 3  VIEWS RIGHT    CLINICAL HISTORY:  fall;    TECHNIQUE:  AP view of the pelvis and frog leg lateral view of the right hip were performed.    COMPARISON:  None    FINDINGS:  Bones are mildly demineralized.  No acute displaced fracture.  No dislocation.  No advanced degenerative changes.                                       Medications   acetaminophen tablet 1,000 mg (1,000 mg Oral Given 1/31/24 1235)   atorvastatin tablet 80 mg (80 mg Oral Given 1/31/24 2049)   levothyroxine tablet 25 mcg (has no administration in time range)   mirtazapine tablet 30 mg (30 mg Oral Given 1/31/24 2049)   propranoloL tablet 10 mg (10 mg Oral Given 1/31/24 2049)   OXcarbazepine tablet 300 mg (300 mg Oral Given 1/31/24 2049)   FLUoxetine capsule 40 mg (40 mg Oral Given 1/31/24 1527)   sodium chloride 0.9% flush 5 mL (has no administration in time range)   albuterol-ipratropium 2.5 mg-0.5 mg/3 mL nebulizer solution 3 mL (has no administration in time range)   melatonin tablet 6 mg (has no administration in time range)   ondansetron disintegrating tablet 4 mg (has no administration in time range)   prochlorperazine injection Soln 5 mg (has no administration in time range)   polyethylene glycol packet 17 g (has no administration in time range)   bisacodyL suppository 10 mg (has no administration in time range)   simethicone chewable tablet 80 mg (has no administration in time range)   aluminum-magnesium hydroxide-simethicone 200-200-20 mg/5 mL suspension 30 mL (30 mLs Oral Given 1/31/24 1716)   naloxone 0.4 mg/mL injection 0.02 mg (has no administration in time range)   glucose chewable tablet 16 g (has no administration in time range)   glucose chewable tablet 24 g (has no administration in time range)   glucagon (human recombinant) injection 1 mg (has no administration in time range)   dextrose 10% bolus 125 mL 125 mL  (has no administration in time range)   dextrose 10% bolus 250 mL 250 mL (has no administration in time range)   aspirin chewable tablet 81 mg (81 mg Oral Given 1/31/24 1639)   INGREZZA 40 MG CAPS (80 mg Oral Given 1/31/24 1715)   acetaminophen tablet 650 mg (650 mg Oral Given 1/31/24 1716)   sodium chloride 0.9% bolus 500 mL 500 mL (0 mLs Intravenous Stopped 1/31/24 0100)   acetaminophen tablet 1,000 mg (1,000 mg Oral Given 1/31/24 0026)   LORazepam tablet 0.5 mg (0.5 mg Oral Given 1/31/24 0026)   LORazepam injection 1 mg (1 mg Intravenous Given 1/31/24 0442)     Medical Decision Making  This is a pleasant 68-year-old female presents the ER for evaluation of falls weakness worsening tremors.  Reports has been having frequent falls last 4 days with hit to head.  Not on blood thinners no loss of consciousness.  Patient endorses she feels weak.  She does live at home.  She is complaining of right hip pain, worsening tremors, weakness.  She arrived in the ER no major signs of trauma moves all extremities spontaneously she was chronically ill-appearing.  Differential includes electrolyte abnormality, infection, intracranial trauma, traumatic fracture, other cause.  Will plan blood work CT scan x-ray symptomatic support reassess.    Amount and/or Complexity of Data Reviewed  External Data Reviewed: labs, radiology, ECG and notes.  Labs: ordered. Decision-making details documented in ED Course.  Radiology: ordered and independent interpretation performed. Decision-making details documented in ED Course.  ECG/medicine tests: ordered and independent interpretation performed. Decision-making details documented in ED Course.    Risk  OTC drugs.  Prescription drug management.               ED Course as of 01/31/24 2348   Wed Jan 31, 2024   0547 CBC auto differential(!) [SE]   0547 Magnesium [SE]   0547 Hepatitis C Antibody [SE]   0547 Troponin I [SE]   0547 Comprehensive metabolic panel(!) [SE]   0547 CT Head Without Contrast  [SE]   0547 CT Cervical Spine Without Contrast [SE]   0547 X-Ray Chest AP Portable [SE]   0547 X-Ray Hip 2 or 3 views Right (with Pelvis when performed)  Resting in bed no distress labs imaging obtained and reviewed no acute process identified.  Patient reports concerned due to frequent falls and functional decline.  I discussed with her options, will plan to have physical therapy and occupational therapy seen evaluate patient.  Still awaiting UA.  Patient understood agreed with plan. [SE]      ED Course User Index  [SE] Fátima Cardoso MD                           Clinical Impression:  Final diagnoses:  [R53.1] Weakness  [R07.9] Chest pain          ED Disposition Condition    Observation Stable                Fátima Cardoso MD  01/31/24 2953

## 2024-01-31 NOTE — PLAN OF CARE
Problem: Occupational Therapy  Goal: Occupational Therapy Goal  Description: Goals to be met by: 2/7/24     Patient will increase functional independence with ADLs by performing:    UE Dressing with Standby Assistance.  LE Dressing with Standby Assistance.  Grooming while standing at sink with Stand-by Assistance.  Toileting from toilet with Contact Guard Assistance for hygiene and clothing management.   Step transfer with Supervision  Toilet transfer to toilet with Supervision.    Outcome: Ongoing, Progressing

## 2024-02-01 VITALS
RESPIRATION RATE: 18 BRPM | HEART RATE: 94 BPM | SYSTOLIC BLOOD PRESSURE: 148 MMHG | WEIGHT: 130 LBS | DIASTOLIC BLOOD PRESSURE: 58 MMHG | HEIGHT: 62 IN | TEMPERATURE: 98 F | BODY MASS INDEX: 23.92 KG/M2 | OXYGEN SATURATION: 94 %

## 2024-02-01 PROBLEM — F33.3 MDD (MAJOR DEPRESSIVE DISORDER), RECURRENT, SEVERE, WITH PSYCHOSIS: Status: ACTIVE | Noted: 2022-06-23

## 2024-02-01 PROBLEM — R53.1 WEAKNESS: Status: ACTIVE | Noted: 2024-02-01

## 2024-02-01 PROBLEM — G24.01 TARDIVE DYSKINESIA: Status: ACTIVE | Noted: 2018-03-17

## 2024-02-01 LAB
ALBUMIN SERPL BCP-MCNC: 3.5 G/DL (ref 3.5–5.2)
ALP SERPL-CCNC: 130 U/L (ref 55–135)
ALT SERPL W/O P-5'-P-CCNC: 24 U/L (ref 10–44)
ANION GAP SERPL CALC-SCNC: 9 MMOL/L (ref 8–16)
AST SERPL-CCNC: 24 U/L (ref 10–40)
BASOPHILS # BLD AUTO: 0.07 K/UL (ref 0–0.2)
BASOPHILS NFR BLD: 1 % (ref 0–1.9)
BILIRUB SERPL-MCNC: 0.8 MG/DL (ref 0.1–1)
BUN SERPL-MCNC: 9 MG/DL (ref 8–23)
CALCIUM SERPL-MCNC: 8.9 MG/DL (ref 8.7–10.5)
CHLORIDE SERPL-SCNC: 110 MMOL/L (ref 95–110)
CO2 SERPL-SCNC: 21 MMOL/L (ref 23–29)
CREAT SERPL-MCNC: 0.9 MG/DL (ref 0.5–1.4)
DIFFERENTIAL METHOD BLD: ABNORMAL
EOSINOPHIL # BLD AUTO: 0.2 K/UL (ref 0–0.5)
EOSINOPHIL NFR BLD: 2.3 % (ref 0–8)
ERYTHROCYTE [DISTWIDTH] IN BLOOD BY AUTOMATED COUNT: 13.9 % (ref 11.5–14.5)
EST. GFR  (NO RACE VARIABLE): >60 ML/MIN/1.73 M^2
GLUCOSE SERPL-MCNC: 94 MG/DL (ref 70–110)
HCT VFR BLD AUTO: 34.7 % (ref 37–48.5)
HGB BLD-MCNC: 11.8 G/DL (ref 12–16)
IMM GRANULOCYTES # BLD AUTO: 0.02 K/UL (ref 0–0.04)
IMM GRANULOCYTES NFR BLD AUTO: 0.3 % (ref 0–0.5)
LYMPHOCYTES # BLD AUTO: 0.8 K/UL (ref 1–4.8)
LYMPHOCYTES NFR BLD: 10.3 % (ref 18–48)
MCH RBC QN AUTO: 31.4 PG (ref 27–31)
MCHC RBC AUTO-ENTMCNC: 34 G/DL (ref 32–36)
MCV RBC AUTO: 92 FL (ref 82–98)
MONOCYTES # BLD AUTO: 0.6 K/UL (ref 0.3–1)
MONOCYTES NFR BLD: 7.9 % (ref 4–15)
NEUTROPHILS # BLD AUTO: 5.7 K/UL (ref 1.8–7.7)
NEUTROPHILS NFR BLD: 78.2 % (ref 38–73)
NRBC BLD-RTO: 0 /100 WBC
PLATELET # BLD AUTO: 186 K/UL (ref 150–450)
PMV BLD AUTO: 9.9 FL (ref 9.2–12.9)
POTASSIUM SERPL-SCNC: 3.6 MMOL/L (ref 3.5–5.1)
PROT SERPL-MCNC: 6.2 G/DL (ref 6–8.4)
RBC # BLD AUTO: 3.76 M/UL (ref 4–5.4)
SODIUM SERPL-SCNC: 140 MMOL/L (ref 136–145)
WBC # BLD AUTO: 7.3 K/UL (ref 3.9–12.7)

## 2024-02-01 PROCEDURE — 25000003 PHARM REV CODE 250

## 2024-02-01 PROCEDURE — 93010 ELECTROCARDIOGRAM REPORT: CPT | Mod: ,,, | Performed by: INTERNAL MEDICINE

## 2024-02-01 PROCEDURE — 25000003 PHARM REV CODE 250: Performed by: PHYSICIAN ASSISTANT

## 2024-02-01 PROCEDURE — 97116 GAIT TRAINING THERAPY: CPT

## 2024-02-01 PROCEDURE — G0378 HOSPITAL OBSERVATION PER HR: HCPCS

## 2024-02-01 PROCEDURE — 80053 COMPREHEN METABOLIC PANEL: CPT

## 2024-02-01 PROCEDURE — 90792 PSYCH DIAG EVAL W/MED SRVCS: CPT | Mod: ,,, | Performed by: PSYCHIATRY & NEUROLOGY

## 2024-02-01 PROCEDURE — 85025 COMPLETE CBC W/AUTO DIFF WBC: CPT

## 2024-02-01 PROCEDURE — 93005 ELECTROCARDIOGRAM TRACING: CPT

## 2024-02-01 RX ORDER — MIRTAZAPINE 7.5 MG/1
7.5 TABLET, FILM COATED ORAL NIGHTLY
COMMUNITY

## 2024-02-01 RX ORDER — MIRTAZAPINE 45 MG/1
45 TABLET, FILM COATED ORAL NIGHTLY
Qty: 90 TABLET | Refills: 1 | Status: SHIPPED | OUTPATIENT
Start: 2024-02-01 | End: 2024-02-01 | Stop reason: SDUPTHER

## 2024-02-01 RX ORDER — FLUOXETINE HYDROCHLORIDE 40 MG/1
40 CAPSULE ORAL DAILY
Qty: 90 CAPSULE | Refills: 1 | Status: SHIPPED | OUTPATIENT
Start: 2024-02-01

## 2024-02-01 RX ORDER — MIRTAZAPINE 30 MG/1
30 TABLET, FILM COATED ORAL NIGHTLY
COMMUNITY

## 2024-02-01 RX ORDER — ALUMINUM HYDROXIDE, MAGNESIUM HYDROXIDE, AND SIMETHICONE 1200; 120; 1200 MG/30ML; MG/30ML; MG/30ML
5 SUSPENSION ORAL
Status: COMPLETED | OUTPATIENT
Start: 2024-02-01 | End: 2024-02-01

## 2024-02-01 RX ORDER — LORAZEPAM 1 MG/1
1 TABLET ORAL
Status: DISCONTINUED | OUTPATIENT
Start: 2024-02-01 | End: 2024-02-01

## 2024-02-01 RX ADMIN — PROPRANOLOL HYDROCHLORIDE 10 MG: 10 TABLET ORAL at 09:02

## 2024-02-01 RX ADMIN — OXCARBAZEPINE 300 MG: 150 TABLET, FILM COATED ORAL at 09:02

## 2024-02-01 RX ADMIN — LEVOTHYROXINE SODIUM 25 MCG: 25 TABLET ORAL at 06:02

## 2024-02-01 RX ADMIN — ALUMINUM HYDROXIDE, MAGNESIUM HYDROXIDE, AND SIMETHICONE 5 ML: 200; 200; 20 SUSPENSION ORAL at 09:02

## 2024-02-01 RX ADMIN — ASPIRIN 81 MG CHEWABLE TABLET 81 MG: 81 TABLET CHEWABLE at 09:02

## 2024-02-01 RX ADMIN — FLUOXETINE HYDROCHLORIDE 40 MG: 20 CAPSULE ORAL at 09:02

## 2024-02-01 RX ADMIN — ACETAMINOPHEN 650 MG: 325 TABLET ORAL at 09:02

## 2024-02-01 RX ADMIN — ACETAMINOPHEN 1000 MG: 500 TABLET ORAL at 05:02

## 2024-02-01 NOTE — DISCHARGE SUMMARY
ED Observation Unit  Discharge Summary        History of Present Illness:    68-year-old female multiple medical problems including tardive dyskinesia, hypertension, CVA, unsteady gait presents the ER for evaluation of weakness and falls.  Patient endorses falling multiple times last few days, unsure if hit to head.  She endorses she is feeling weak with worsening tremors.  She is complaining of right hip pain.  She lives in a group home and reports they advised her to come the ER for further evaluation. Of note, patient reports that she was recently hospitalized at an inpatient psychiatric unit and reports that she was discharged from there on 1/22. She reports that most of her current medications are new and were started during her hospitalization.   She denies CP, SOB, fever, chills, nausea, vomiting, or diarrhea.      In the ED: mildly hypertensive otherwise vital signs stable, afebrile. Intake labs largely unremarkable. Hb 11.4, improved from recent baseline. Electrolytes within normal limits. CTH and CT spine without acute findings. CXR negative for acute process. XR R hip negative for fracture or dislocation. Given ativan 1mg inj x2, 1g tylenol, and 500 mL NS.     Observation Course:    Patient placed in Obs for frequent falls, polypharmacy.  Psychiatry consulted for medication management.  Patient evaluated by PT/OT as well as social work.  Patient ultimately established with home health and felt stable for discharge back to group home with updated medication recommendations.    Consultants:    Psychiatry, PT/OT     Final Diagnosis:  Frequent falls, polypharmacy    Discharge Condition: Good    Disposition: Home or Self Care     Time spent on the discharge of the patient including review of hospital course with the patient. reviewing discharge medications and arranging follow-up care 35 minutes.  Patient was seen and examined on the date of discharge and determined to be suitable for discharge.    Follow  Up:  No future appointments.

## 2024-02-01 NOTE — PLAN OF CARE
HERMAN faxed the following information to the [facility] of [facility] [phone]:    - ED Triage Notes  - Provider Notes  - PT/OT evaluations  - H&P  - Psych Evaluation   - Home Health Orders  - D/C Summary        Mona Romero CD, MSW, LMSW, RSW   Case Management  [facility]  Email: [email or web address]

## 2024-02-01 NOTE — ED NOTES
Patient assisted to recliner.  Walker in reach.  Call button in reach. Instructed not to get up without assistance. Verbalized understanding.

## 2024-02-01 NOTE — PROVIDER PROGRESS NOTES - EMERGENCY DEPT.
Encounter Date: 1/30/2024    ED Physician Progress Notes         EKG - STEMI Decision  Initial Reading: No STEMI present.    1026 EKG 2/1/2024
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
## 2024-02-01 NOTE — ED NOTES
Took report from Wander BERGER, and assumed care of pt at this time. Pt resting comfortably and independently repositioned in stretcher with bed locked in lowest position for safety. NAD noted at this time. Respirations even and unlabored and visible chest rise noted.  Patient offered bathroom assistance and denies need at this time. Pt instructed to call if assistance is needed. No needs at this time. Will continue to monitor.

## 2024-02-01 NOTE — ED NOTES
LOC: The patient is awake, alert, and oriented to self, place, time, and situation. Pt is anxious but cooperative.      APPEARANCE: Patient resting comfortably. Patient is clean and well groomed.     SKIN: The skin is warm and dry; color consistent with ethnicity.  Patient has normal skin turgor and moist mucus membranes.  Skin intact.      MUSCULOSKELETAL: Patient moving upper and lower extremities without difficulty; denies pain in the extremities or back.      RESPIRATORY: Airway is open and patent. Respirations spontaneous, even, easy, and non-labored.  Patient has a normal effort and rate.      CARDIAC:   No peripheral edema noted. No complaints of chest pain. Radial pulse regular.     ABDOMEN: Soft and non tender to palpation.  No distention noted. Pt denies abdominal pain; denies nausea, vomiting, diarrhea, or constipation.     NEUROLOGIC: Eyes open spontaneously. Follows commands; facial expression symmetrical.  Purposeful motor response noted; normal sensation in all extremities.

## 2024-02-01 NOTE — PLAN OF CARE
[facility]      HOME HEALTH ORDERS  FACE TO FACE ENCOUNTER    Patient Name: Sherie Fernandez  YOB: 1955    PCP: Monique, Primary Doctor   PCP Address: None  PCP Phone Number: None  PCP Fax: None    Encounter Date: 1/30/24    Admit to Home Health    Diagnoses:  There are no hospital problems to display for this patient.      Follow Up Appointments:  No future appointments.    Allergies:  Review of patient's allergies indicates:   Allergen Reactions    Lurasidone hcl     Olanzapine Other (See Comments)     Tardive dyskinesia, dystonia      Quetiapine      Other reaction(s): Tardive dyskinesia    Risperidone Other (See Comments)     Tardive dyskinesia.   Risperdal, Zyprexa and Latuda all caused Tardive Dyskinsia          Medications: Review discharge medications with patient and family and provide education.    Current Facility-Administered Medications   Medication Dose Route Frequency Provider Last Rate Last Admin    acetaminophen tablet 1,000 mg  1,000 mg Oral Q8H Jessica Bustos PA-BOLIVAR   1,000 mg at 02/01/24 0510    acetaminophen tablet 650 mg  650 mg Oral Q6H PRN Amadou Osborne PA-BOLIVAR   650 mg at 02/01/24 0932    albuterol-ipratropium 2.5 mg-0.5 mg/3 mL nebulizer solution 3 mL  3 mL Nebulization Q4H PRN Jessica Bustos PA-BOLIVAR        aluminum-magnesium hydroxide-simethicone 200-200-20 mg/5 mL suspension 30 mL  30 mL Oral QID PRN Jessica Bustos PA-C   30 mL at 01/31/24 1716    aspirin chewable tablet 81 mg  81 mg Oral Daily Jessica Bustos PA-BOLIVAR   81 mg at 02/01/24 0915    atorvastatin tablet 80 mg  80 mg Oral QHS Jessica Bustos PA-BOLIVAR   80 mg at 01/31/24 2049    bisacodyL suppository 10 mg  10 mg Rectal Daily PRN Jessica Bustos PA-C        dextrose 10% bolus 125 mL 125 mL  12.5 g Intravenous PRN Jessica Bustos PA-BOLIVAR        dextrose 10% bolus 250 mL 250 mL  25 g Intravenous PRN Jessica Bustos PA-C        FLUoxetine capsule 40 mg  40 mg Oral Daily Jessica Bustos PA-C   40 mg at  02/01/24 0915    glucagon (human recombinant) injection 1 mg  1 mg Intramuscular PRN Jessica Bustos PA-C        glucose chewable tablet 16 g  16 g Oral PRN Jessica Bustos PA-BOLIVAR        glucose chewable tablet 24 g  24 g Oral PRN Jessica Bustos PA-C        INGREZZA 40 MG CAPS  80 mg Oral Daily Jessica Bustos PA-C   80 mg at 01/31/24 1715    levothyroxine tablet 25 mcg  25 mcg Oral Before breakfast Jessica Bustos PA-C   25 mcg at 02/01/24 0608    melatonin tablet 6 mg  6 mg Oral Nightly PRN Jessica Bustos PA-C        mirtazapine tablet 30 mg  30 mg Oral QHS Jessica uBstos PA-C   30 mg at 01/31/24 2049    naloxone 0.4 mg/mL injection 0.02 mg  0.02 mg Intravenous PRN Jessica Bustos PA-C        ondansetron disintegrating tablet 4 mg  4 mg Oral Q8H PRN Jessica Bustos PA-C        OXcarbazepine tablet 300 mg  300 mg Oral BID Jessica Bustos PA-C   300 mg at 02/01/24 0916    polyethylene glycol packet 17 g  17 g Oral BID PRN Jessica Bustos PA-C        prochlorperazine injection Soln 5 mg  5 mg Intravenous Q6H PRN Jessica Bustos PA-C        propranoloL tablet 10 mg  10 mg Oral TID Jessica Bustos PA-C   10 mg at 02/01/24 0916    simethicone chewable tablet 80 mg  1 tablet Oral QID PRN Jessica Bustos PA-BOLIVAR        sodium chloride 0.9% flush 5 mL  5 mL Intravenous PRN Jessica Bustos PA-C         Current Outpatient Medications   Medication Sig Dispense Refill    aspirin 81 MG Chew Take 1 tablet (81 mg total) by mouth once daily. 30 tablet 0    atorvastatin (LIPITOR) 80 MG tablet Take 80 mg by mouth every evening.      ENBRACE HR 1.5 mg iron- 8.73 mg-6.4 mg capsule Take 1 capsule by mouth once daily.      FLUoxetine 40 MG capsule 40 mg 2 (two) times daily.      gabapentin (NEURONTIN) 300 MG capsule Take 600 mg by mouth 3 (three) times daily.      hydrOXYzine pamoate (VISTARIL) 25 MG Cap Take 25 mg by mouth every 6 (six) hours as needed (anxiety //agitation).      levothyroxine (SYNTHROID) 25  MCG tablet Take 1 tablet (25 mcg total) by mouth before breakfast. 30 tablet 0    mirtazapine (REMERON) 30 MG tablet Take 30 mg by mouth. Along with 7.5 mg (37.5 mg) total at bedtime as needed for anxiety.      mirtazapine (REMERON) 7.5 MG Tab Take 15 mg by mouth once daily.      OLANZapine (ZYPREXA) 2.5 MG tablet Take 2.5 mg by mouth every evening.      OXcarbazepine (TRILEPTAL) 300 MG Tab Take 300 mg by mouth 2 (two) times daily.      propranoloL (INDERAL) 10 MG tablet Take 1 tablet (10 mg total) by mouth 2 (two) times daily. (Patient taking differently: Take 10 mg by mouth 3 (three) times daily. At 6 am , noon and 6 pm for anxiety) 60 tablet 0    valbenazine (INGREZZA) 40 mg Cap Take 2 capsules by mouth every evening.      vibegron (GEMTESA) 75 mg Tab Take 1 tablet by mouth once daily.       Current Discharge Medication List        CONTINUE these medications which have NOT CHANGED    Details   aspirin 81 MG Chew Take 1 tablet (81 mg total) by mouth once daily.  Qty: 30 tablet, Refills: 0      atorvastatin (LIPITOR) 80 MG tablet Take 80 mg by mouth every evening.      ENBRACE HR 1.5 mg iron- 8.73 mg-6.4 mg capsule Take 1 capsule by mouth once daily.      FLUoxetine 40 MG capsule 40 mg 2 (two) times daily.      gabapentin (NEURONTIN) 300 MG capsule Take 600 mg by mouth 3 (three) times daily.      hydrOXYzine pamoate (VISTARIL) 25 MG Cap Take 25 mg by mouth every 6 (six) hours as needed (anxiety //agitation).      levothyroxine (SYNTHROID) 25 MCG tablet Take 1 tablet (25 mcg total) by mouth before breakfast.  Qty: 30 tablet, Refills: 0      !! mirtazapine (REMERON) 30 MG tablet Take 30 mg by mouth. Along with 7.5 mg (37.5 mg) total at bedtime as needed for anxiety.      !! mirtazapine (REMERON) 7.5 MG Tab Take 15 mg by mouth once daily.      OLANZapine (ZYPREXA) 2.5 MG tablet Take 2.5 mg by mouth every evening.      OXcarbazepine (TRILEPTAL) 300 MG Tab Take 300 mg by mouth 2 (two) times daily.      propranoloL  (INDERAL) 10 MG tablet Take 1 tablet (10 mg total) by mouth 2 (two) times daily.  Qty: 60 tablet, Refills: 0    Comments: .      valbenazine (INGREZZA) 40 mg Cap Take 2 capsules by mouth every evening.      vibegron (GEMTESA) 75 mg Tab Take 1 tablet by mouth once daily.       !! - Potential duplicate medications found. Please discuss with provider.            I have seen and examined this patient within the last 30 days. My clinical findings that support the need for the home health skilled services and home bound status are the following:no   Weakness/numbness causing balance and gait disturbance due to Weakness/Debility making it taxing to leave home.     Diet:   no change    Labs:  N/a    Referrals/ Consults  Physical Therapy to evaluate and treat. Evaluate for home safety and equipment needs; Establish/upgrade home exercise program. Perform / instruct on therapeutic exercises, gait training, transfer training, and Range of Motion.  Occupational Therapy to evaluate and treat. Evaluate home environment for safety and equipment needs. Perform/Instruct on transfers, ADL training, ROM, and therapeutic exercises.    Activities:   activity as tolerated    Nursing:   Agency to admit patient within 24 hours of hospital discharge unless specified on physician order or at patient request    SN to complete comprehensive assessment including routine vital signs. Instruct on disease process and s/s of complications to report to MD. Review/verify medication list sent home with the patient at time of discharge  and instruct patient/caregiver as needed. Frequency may be adjusted depending on start of care date.     Skilled nurse to perform up to 3 visits PRN for symptoms related to diagnosis    Notify MD if SBP > 160 or < 90; DBP > 90 or < 50; HR > 120 or < 50; Temp > 101; O2 < 88%; Other:       Ok to schedule additional visits based on staff availability and patient request on consecutive days within the [place] health  episode.    When multiple disciplines ordered:    Start of Care occurs on Sunday - Wednesday schedule remaining discipline evaluations as ordered on separate consecutive days following the start of care.    Thursday SOC -schedule subsequent evaluations Friday and Monday the following week.     Friday - Saturday SOC - schedule subsequent discipline evaluations on consecutive days starting Monday of the following week.    For all post-discharge communication and subsequent orders please contact patient's primary care physician. If unable to reach primary care physician or do not receive response within 30 minutes, please contact [phone] for clinical staff order clarification    Miscellaneous       Home Health Aide:  Physical Therapy  and Occupational Therapy     Wound Care Orders  no    I certify that this patient is confined to her home and needs physical therapy and occupational therapy.

## 2024-02-01 NOTE — DISCHARGE INSTRUCTIONS
Per psychiatry, patient should be taking Ingressa (valbenazine ) 60mg daily, fluoxetine 40mg daily, and mirtazepine 45mg nightly for anxiety/depression.

## 2024-02-01 NOTE — PLAN OF CARE
HERMAN confirmed with Rehana from [facility] that pt will admit on Monday 02/05.    HERMAN contacted Zaid, , [phone] and informed him of pt d/c back to group [facility] and home health starting Monday      Mona Romero CD, MSW, LMSW, RSW   Case Management  [facility]  Email: [email or web address]

## 2024-02-01 NOTE — ED NOTES
Assumed care of patient. Pt AAOX4. Bed placed in low locked position, side rails up x2, call bell is within reach. Bed alarm on.  Patient instructed to call for assistance and to remain in bed until a staff member arrives.

## 2024-02-01 NOTE — PLAN OF CARE
HERMAN contacted  Radha at [phone] regarding pt d/c destination.  SW left detailed message.      HERMAN/MARISSA to continue to follow      Mona Romero CD, MSW, LMSW, RSW   Case Management  [facility]  Email: [email or web address]     MEDICATIONS  (STANDING):  amLODIPine   Tablet 5 milliGRAM(s) Oral daily  ammonium lactate 12% Lotion 1 Application(s) Topical two times a day  cholecalciferol 1000 Unit(s) Oral daily  finasteride 5 milliGRAM(s) Oral daily  folic acid 1 milliGRAM(s) Oral daily  memantine 10 milliGRAM(s) Oral two times a day  mirtazapine 22.5 milliGRAM(s) Oral at bedtime  pantoprazole    Tablet 40 milliGRAM(s) Oral before breakfast  risperiDONE   Tablet 0.25 milliGRAM(s) Oral <User Schedule>  risperiDONE   Tablet 0.5 milliGRAM(s) Oral at bedtime    MEDICATIONS  (PRN):  aluminum hydroxide/magnesium hydroxide/simethicone Suspension 30 milliLiter(s) Oral every 4 hours PRN Dyspepsia  artificial  tears Solution 1 Drop(s) Both EYES four times a day PRN eye irritation  glycerin Suppository - Adult 1 Suppository(s) Rectal daily PRN constipation  risperiDONE   Tablet 0.25 milliGRAM(s) Oral every 8 hours PRN Agitation  saline laxative (FLEET) Rectal Enema 1 Enema Rectal daily PRN constipation

## 2024-02-01 NOTE — PROGRESS NOTES
ED Observation Unit  Progress Note      HPI   Pleasant 68-year-old female multiple medical problems including tardive dyskinesia, hypertension, CVA, unsteady gait presents the ER for evaluation of weakness and falls.  Patient endorses falling multiple times last few days, unsure if hit to head.  She endorses she is feeling weak with worsening tremors.  She is complaining of right hip pain.  She lives in a group home and reports they advised her to come the ER for further evaluation. Of note, patient reports that she was recently hospitalized at an inpatient psychiatric unit and reports that she was discharged from there on 1/22. She reports that most of her current medications are new and were started during her hospitalization.   She denies CP, SOB, fever, chills, nausea, vomiting, or diarrhea.     In the ED: mildly hypertensive otherwise vital signs stable, afebrile. Intake labs largely unremarkable. Hb 11.4, improved from recent baseline. Electrolytes within normal limits. CTH and CT spine without acute findings. CXR negative for acute process. XR R hip negative for fracture or dislocation. Given ativan 1mg inj x2, 1g tylenol, and 500 mL NS.     Interval History   No acute events overnight. VSS.  Psychiatry at bedside.     PMHx   History reviewed. No pertinent past medical history.   History reviewed. No pertinent surgical history.     Family Hx   History reviewed. No pertinent family history.     Social Hx   Social History     Socioeconomic History    Marital status: Single   Tobacco Use    Smoking status: Never    Smokeless tobacco: Never   Substance and Sexual Activity    Alcohol use: Never    Drug use: Never   Other Topics Concern    Patient feels they ought to cut down on drinking/drug use No    Patient annoyed by others criticizing their drinking/drug use No    Patient has felt bad or guilty about drinking/drug use No    Patient has had a drink/used drugs as an eye opener in the AM No        Vital Signs    Vitals:    01/31/24 2054 01/31/24 2054 02/01/24 0138 02/01/24 0453   BP:  (!) 128/41 121/73 (!) 148/76   BP Location:       Patient Position:       Pulse: (!) 59 (!) 57 62 100   Resp:   18 18   Temp:   98.1 °F (36.7 °C) 97.7 °F (36.5 °C)   TempSrc:   Oral Oral   SpO2: 95% 95% (!) 93% 95%   Weight:       Height:            Review of Systems  Review of Systems   Musculoskeletal:  Positive for falls.       Brief Physical Exam/Reassessment   Physical Exam  Vitals reviewed.   Constitutional:       General: She is not in acute distress.     Appearance: She is not diaphoretic.   HENT:      Head: Normocephalic and atraumatic.      Comments: Poor dentition  Cardiovascular:      Rate and Rhythm: Normal rate.   Pulmonary:      Effort: Pulmonary effort is normal. No respiratory distress.   Musculoskeletal:      Cervical back: Neck supple.   Skin:     General: Skin is warm and dry.   Neurological:      Mental Status: She is alert.   Psychiatric:         Mood and Affect: Affect normal. Mood is anxious.         Speech: Speech is delayed.         Labs/Imaging   Labs Reviewed   CBC W/ AUTO DIFFERENTIAL - Abnormal; Notable for the following components:       Result Value    RBC 3.59 (*)     Hemoglobin 11.4 (*)     Hematocrit 35.0 (*)     MCH 31.8 (*)     All other components within normal limits   COMPREHENSIVE METABOLIC PANEL - Abnormal; Notable for the following components:    Chloride 112 (*)     Total Protein 5.8 (*)     Albumin 3.2 (*)     All other components within normal limits   URINALYSIS, REFLEX TO URINE CULTURE - Abnormal; Notable for the following components:    Specific Gravity, UA >1.030 (*)     Protein, UA 1+ (*)     All other components within normal limits    Narrative:     Specimen Source->Urine   COMPREHENSIVE METABOLIC PANEL - Abnormal; Notable for the following components:    CO2 21 (*)     All other components within normal limits   CBC W/ AUTO DIFFERENTIAL - Abnormal; Notable for the following components:     RBC 3.76 (*)     Hemoglobin 11.8 (*)     Hematocrit 34.7 (*)     MCH 31.4 (*)     Lymph # 0.8 (*)     Gran % 78.2 (*)     Lymph % 10.3 (*)     All other components within normal limits   HIV 1 / 2 ANTIBODY    Narrative:     Release to patient->Immediate   HEPATITIS C ANTIBODY    Narrative:     Release to patient->Immediate   MAGNESIUM   TROPONIN I   TSH   URINALYSIS MICROSCOPIC    Narrative:     Specimen Source->Urine      Imaging Results              CT Head Without Contrast (Final result)  Result time 01/31/24 04:22:46      Final result by Mino Fxo MD (01/31/24 04:22:46)                   Impression:      1. No CT evidence of acute intracranial abnormality.  Remote appearing infarct in the left parietotemporal lobe.  2. No acute fracture in the cervical spine.    Electronically signed by resident: Laurel Moreno  Date:    01/31/2024  Time:    03:44    Electronically signed by: Mino Fox MD  Date:    01/31/2024  Time:    04:22               Narrative:    EXAMINATION:  CT HEAD WITHOUT CONTRAST; CT CERVICAL SPINE WITHOUT CONTRAST    CLINICAL HISTORY:  Head trauma, minor (Age >= 65y);; Neck trauma (Age >= 65y);    TECHNIQUE:  Low dose axial CT images obtained throughout the head without the use of intravenous contrast.  Axial, sagittal and coronal reconstructions were performed.    COMPARISON:  None.    FINDINGS:  CT head:    Remote appearing infarct involving the left parietotemporal lobe.    No parenchymal mass, hemorrhage, edema or major vascular distribution infarct.    Ventricles and sulci are normal in size for age without evidence of hydrocephalus.    No extra-axial blood or fluid collections.    Skull/extracranial contents (limited evaluation):    No displaced calvarial fractures.  Paranasal sinuses and mastoid air cells are essentially clear.    CT cervical spine:    Alignment: Straightening of the normal cervical lordosis, which could be positional or related to muscular strain.  Grossly  normal sagittal alignment.    Vertebrae: No fracture.    Discs: Multilevel disc height loss most severe at C5-C6.    C1-2: Dens is intact.  Pre-dens space is maintained.    Skull base and craniocervical junction: Normal.    Degenerative findings:    Multilevel cervical spine spondylosis with posterior disc osteophyte complexes, uncovertebral joint spurring and facet arthropathy, most advanced at C5-C6 resulting in mild canal stenosis and bilateral mild neural foraminal narrowing.    The soft tissue structures visualized in the neck are unremarkable.    The airway is patent and the lung apices are unremarkable.                                       CT Cervical Spine Without Contrast (Final result)  Result time 01/31/24 04:22:46      Final result by Mino Fox MD (01/31/24 04:22:46)                   Impression:      1. No CT evidence of acute intracranial abnormality.  Remote appearing infarct in the left parietotemporal lobe.  2. No acute fracture in the cervical spine.    Electronically signed by resident: Laurel Moreno  Date:    01/31/2024  Time:    03:44    Electronically signed by: Mino Fox MD  Date:    01/31/2024  Time:    04:22               Narrative:    EXAMINATION:  CT HEAD WITHOUT CONTRAST; CT CERVICAL SPINE WITHOUT CONTRAST    CLINICAL HISTORY:  Head trauma, minor (Age >= 65y);; Neck trauma (Age >= 65y);    TECHNIQUE:  Low dose axial CT images obtained throughout the head without the use of intravenous contrast.  Axial, sagittal and coronal reconstructions were performed.    COMPARISON:  None.    FINDINGS:  CT head:    Remote appearing infarct involving the left parietotemporal lobe.    No parenchymal mass, hemorrhage, edema or major vascular distribution infarct.    Ventricles and sulci are normal in size for age without evidence of hydrocephalus.    No extra-axial blood or fluid collections.    Skull/extracranial contents (limited evaluation):    No displaced calvarial fractures.   "Paranasal sinuses and mastoid air cells are essentially clear.    CT cervical spine:    Alignment: Straightening of the normal cervical lordosis, which could be positional or related to muscular strain.  Grossly normal sagittal alignment.    Vertebrae: No fracture.    Discs: Multilevel disc height loss most severe at C5-C6.    C1-2: Dens is intact.  Pre-dens space is maintained.    Skull base and craniocervical junction: Normal.    Degenerative findings:    Multilevel cervical spine spondylosis with posterior disc osteophyte complexes, uncovertebral joint spurring and facet arthropathy, most advanced at C5-C6 resulting in mild canal stenosis and bilateral mild neural foraminal narrowing.    The soft tissue structures visualized in the neck are unremarkable.    The airway is patent and the lung apices are unremarkable.                                       X-Ray Chest AP Portable (Final result)  Result time 01/31/24 00:24:53      Final result by Mino Fox MD (01/31/24 00:24:53)                   Impression:      No obvious acute abnormality identified on this hypoventilatory limited single view.    Hiatal hernia.      Electronically signed by: Mino Fox MD  Date:    01/31/2024  Time:    00:24               Narrative:    EXAMINATION:  XR CHEST AP PORTABLE    CLINICAL HISTORY:  Provided history is "  Weakness".    TECHNIQUE:  One view of the chest.    COMPARISON:  12/13/2022.    FINDINGS:  Cardiac wires overlie the chest.  Lung volumes are low, accentuating basilar markings.  Cardiomediastinal silhouette is magnified by portable technique and low lung volumes, potentially mildly enlarged.  Hiatal hernia noted.  Coarse bibasilar interstitial lung markings but no large focal area of consolidation.  No large pleural effusion.  No pneumothorax.                                       X-Ray Hip 2 or 3 views Right (with Pelvis when performed) (Final result)  Result time 01/31/24 00:23:40      Final result by " "Mino Fox MD (01/31/24 00:23:40)                   Impression:      No acute displaced fracture.      Electronically signed by: Mino Fox MD  Date:    01/31/2024  Time:    00:23               Narrative:    EXAMINATION:  XR HIP WITH PELVIS WHEN PERFORMED, 2 OR 3  VIEWS RIGHT    CLINICAL HISTORY:  fall;    TECHNIQUE:  AP view of the pelvis and frog leg lateral view of the right hip were performed.    COMPARISON:  None    FINDINGS:  Bones are mildly demineralized.  No acute displaced fracture.  No dislocation.  No advanced degenerative changes.                                       I reviewed all labs, imaging, EKGs.     Plan   Frequent falls  Orthostatic hypotension  Polypharmacy   - VSSAF  - intake labs largely unremarkable  - CTH and cervical spine negative  - XR R hip negative for fracture or dislocation  - orthostatics slightly positive; laying (174/77) --> standing (152/78), will give 1L NS and reassess  - concern for polypharmacy s/p recent psychiatric hospitalization with several medications changes, consulted pharmD for med reconciliation and psychiatry for assistance  - medications brought by the patient include: Oxcarbazepine 300 mg BID, fluoxetine 40 mg daily, atorvastatin 80 mg daily, propranolol 10 mg TID, hydroxyzine 25 mg q6 hours (patient reports taking q6 hours), synthroid 25 mcg, Ingrezza 40 mg daily and 60 mg qhs (non-formulary medication ordered), gabapentin 300 mg TID prn, mirtazapine 7.5 mg daily and 30 mg qhs, and olanzapine 2.5 mg qhs  - holding gabapentin, olanzapine and hydroxyzine  - PT/OT consulted and recommend moderate intensity, however, patient refuses SNF placement and insurance would be a barrier for post-acute placement   PT also recommends rolling walker. Per PT note:   "DME Justification: Patient demonstrates a mobility limitation that significantly impairs their ability to participate in one or more mobility related activities of daily living. Patient's mobility " "limitation cannot be sufficiently resolved with the use of a cane, but can be sufficiently resolved with the use of a rolling walker.The use of a rolling walker will considerably improve their ability to participate in MRADLs. Patient will use the walker on a regular basis at home."    - CM/SW assistance with return to group home at d/c and HH initiation vs. OP PT/OT with transport  - fall precautions      I have discussed this case with GARY Osborne.     "

## 2024-02-01 NOTE — PT/OT/SLP PROGRESS
Physical Therapy Treatment    Patient Name:  Sherie Fernandez   MRN:  [id number]  Admitting Diagnosis:  Weakness   Recent Surgery: * No surgery found *    Admit Date: 1/30/2024  Length of Stay: 0 days    Recommendations:     Discharge Recommendations:  Moderate Intensity Therapy  Discharge Equipment Recommendations: walker, rolling   Justification for Equipment: The mobility limitation cannot be sufficiently resolved by the use of a cane. Patient's functional mobility deficit can be sufficiently resolved with the use of a Rolling Walker. Patient's mobility limitation significantly impairs their ability to participate in one of more activities of daily living. The use of a Rolling Walker will significantly improve the patient's ability to participate in MRADLS and the patient will use it on regular basis in the home.   Barriers to discharge: Inaccessible home environment, Decreased Caregiver support, and Evolving Clinical Presentation    Assessment:     Sherie Fernandez is a 68 y.o. female admitted with a medical diagnosis of Weakness.  She presents with the following impairments/functional limitations:  weakness, impaired balance, decreased safety awareness, impaired endurance, impaired cognition, impaired self care skills, impaired functional mobility, gait instability, decreased lower extremity function, decreased ROM.     Pt agreeable to therapy, fixated on making phone calls and speaking to the doctor. Pt with limited mobility, gait limited by dizziness that worsens as treatment progresses. Continue to maximize OOB mobility    Rehab Prognosis: Fair; patient would benefit from acute skilled PT services to address these deficits and reach maximum level of function.    Recent Surgery: * No surgery found *      Treatment Tolerated: Fair    Highest level of mobility achieved this visit: ambulates 15ft w/ HHA and min A    Activity with RN/PCT: transfer with 1 person assist    Plan:     During this hospitalization, patient to be  "seen 3 x/week to address the identified rehab impairments via gait training, therapeutic activities, therapeutic exercises, neuromuscular re-education and progress toward the following goals:    Plan of Care Expires:  03/01/24    Subjective     RN Mona notified prior to session. No family present upon PT entrance into room.    Chief Complaint: pain  Patient/Family Comments/goals: "I need to call my brother"  Pain/Comfort:  Pain Rating 1: 8/10  Location - Side 1: Right  Location - Orientation 1: generalized  Location 1: rib(s)  Pain Addressed 1: Reposition, Distraction      Objective:     Additional staff present: none    Patient found HOB elevated with: telemetry, pulse ox (continuous), PureWick, bed alarm   Cognition:   Oriented X 4 and Tangential  Command following: Follows one-step verbal commands  Fluency: clear/fluent  General Precautions: Standard, fall   Orthopedic Precautions:N/A   Braces: N/A   Body mass index is 23.78 kg/m².  Oxygen Device: Room Air    Vitals: BP (!) 148/58   Pulse 94   Temp 97.5 °F (36.4 °C) (Oral)   Resp 18   Ht 5' 2" (1.575 m)   Wt 59 kg (130 lb)   SpO2 (!) 94%   BMI 23.78 kg/m²     Outcome Measures:  AM-PAC 6 CLICK MOBILITY  Turning over in bed (including adjusting bedclothes, sheets and blankets)?: 3  Sitting down on and standing up from a chair with arms (e.g., wheelchair, bedside commode, etc.): 3  Moving from lying on back to sitting on the side of the bed?: 3  Moving to and from a bed to a chair (including a wheelchair)?: 3  Need to walk in hospital room?: 3  Climbing 3-5 steps with a railing?: 2  Basic Mobility Total Score: 17     Functional Mobility:    Bed Mobility:   Supine to Sit: stand by assistance; from R side of bed  Scooting anteriorly to EOB to have both feet planted on floor: stand by assistance  Sit to Supine: minimum assistance; to L side of bed    Sitting Balance at Edge of Bed:  Assistance Level Required: Stand-by Assistance  Time: 5 min  Postural deviations " noted: slouched posture    Transfers:   Sit <> Stand Transfer: minimum assistance with hand-held assist   Stand <> Sit Transfer: minimum assistance with hand-held assist   x1 trials from EOB    Standing Balance:  Assistance Level Required: Contact Guard Assistance  Patient used: hand-held assist   Time: 8 min  Postural deviations noted: no deviations noted      Gait:  Patient ambulated: 15ft   Patient required: minimal assist  Patient used:  hand-held assist   Gait Pattern observed: swing-to gait  Gait Deviation(s): occasional unsteady gait, decreased step length, decreased weight shift, flexed posture, and decreased tricia  Impairments due to: impaired balance, pain, decreased endurance, and impaired postural control  Gait belt utilized    Education:  Time provided for education, counseling and discussion of health disposition in regards to patient's current status  All questions answered within PT scope of practice and to patient's satisfaction  PT role in POC to address current functional deficits  Pt educated on proper body mechanics, safety techniques, and energy conservation with PT facilitation and cueing throughout session    Patient left HOB elevated with all lines intact, call button in reach, and RN Mona notified.    GOALS:   Multidisciplinary Problems       Physical Therapy Goals          Problem: Physical Therapy    Goal Priority Disciplines Outcome Goal Variances Interventions   Physical Therapy Goal     PT, PT/OT Ongoing, Progressing     Description: PT goals to be met by: 2/29/24    Patient will perform supine <> sitting with supervision.  Patient will perform sit <> stand transitions with supervision using RW.  Patient will perform transfers from bed <> chair or BSC with supervision using RW.  Patient will ambulate 100 feet with supervision using RW.                     Time Tracking:     PT Received On: 02/01/24  PT Start Time: 1312     PT Stop Time: 1331  PT Total Time (min): 19 min     Billable  Minutes:   Gait Training 10 min    Treatment Type: Treatment  PT/PTA: PT       Jose Stubbs, PT, DPT  2/1/2024

## 2024-02-01 NOTE — PROGRESS NOTES
Hospital Medicine Pharmacy Consult Note    PharmD Consult Received For:     Pharmacy to perform an admission medication history and reconciliation  Medication history completed by Rod Cobb pharmacy technician. Please see the pharmacy med rec note documented on 1/31 for the updated medication list.    Pharmacy will sign-off on this portion of the consult    Pharmacy will sign-off, please re-consult as needed    Thank you for the consult,  Poonam David  Extension [phone]    **Note: Consults are reviewed Monday-Friday 7:00am-3:30pm. The above recommendations are only suggested. The recommendations should be considered in conjunction with all patient factors.**

## 2024-02-01 NOTE — PHARMACY MED REC
"Admission Medication History     The home medication history was taken by Rod Cobb.    You may go to "Admission" then "Reconcile Home Medications" tabs to review and/or act upon these items.     The home medication list has been updated by the Pharmacy department.   Please read ALL comments highlighted in yellow.   Please address this information as you see fit.    Feel free to contact us if you have any questions or require assistance.      The medications listed below were removed from the home medication list. Please reorder if appropriate:  Patient reports no longer taking the following medication(s):  CLONAZEPAM 1 MG TABLET  AMBIEN 10 MG TABLET    Medications listed below were obtained from: Patient/family and Analytic software- [employer]  Current Outpatient Medications on File Prior to Encounter   Medication Sig    aspirin 81 MG Chew   Take 1 tablet (81 mg total) by mouth once daily.    atorvastatin (LIPITOR) 80 MG tablet   Take 80 mg by mouth every evening.    ENBRACE HR 1.5 mg iron- 8.73 mg-6.4 mg capsule   Take 1 capsule by mouth once daily.    FLUoxetine 40 MG capsule   Take 1 capsule by mouth twice daily.    gabapentin (NEURONTIN) 300 MG capsule   Take 600 mg by mouth 3 (three) times daily.    hydrOXYzine pamoate (VISTARIL) 25 MG Cap   Take 25 mg by mouth every 6 (six) hours as needed for anxiety and agitation.    levothyroxine (SYNTHROID) 25 MCG tablet   Take 1 tablet (25 mcg total) by mouth before breakfast.    mirtazapine (REMERON) 30 MG tablet   Take 30 mg by mouth. Along with 7.5 mg (37.5 mg) total at bedtime as needed for anxiety.    mirtazapine (REMERON) 7.5 MG Tab   Take 15 mg by mouth once daily.    OLANZapine (ZYPREXA) 2.5 MG tablet   Take 2.5 mg by mouth every evening.    OXcarbazepine (TRILEPTAL) 300 MG Tab   Take 300 mg by mouth 2 (two) times daily.    propranoloL (INDERAL) 10 MG tablet Take 10 mg by mouth 3 (three) times daily at 6 am , noon and 6 pm for anxiety.      valbenazine " (INGREZZA) 40 mg Cap   Take 2 capsules by mouth every evening.    vibegron (GEMTESA) 75 mg Tab   Take 1 tablet by mouth once daily.           Potential issues to be addressed PRIOR TO DISCHARGE  Patient requires education regarding drug therapies     Rod Cobb  EXT [phone]                  .
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
## 2024-02-05 PROCEDURE — G0180 MD CERTIFICATION HHA PATIENT: HCPCS | Mod: ,,, | Performed by: EMERGENCY MEDICINE

## 2024-03-08 ENCOUNTER — EXTERNAL HOME HEALTH (OUTPATIENT)
Dept: HOME HEALTH SERVICES | Facility: HOSPITAL | Age: 69
End: 2024-03-08
Payer: MEDICAID

## 2024-03-11 ENCOUNTER — DOCUMENT SCAN (OUTPATIENT)
Dept: HOME HEALTH SERVICES | Facility: HOSPITAL | Age: 69
End: 2024-03-11
Payer: MEDICAID

## 2024-04-15 ENCOUNTER — HOSPITAL ENCOUNTER (EMERGENCY)
Facility: HOSPITAL | Age: 69
Discharge: HOME | End: 2024-04-15
Attending: EMERGENCY MEDICINE
Payer: MEDICAID

## 2024-04-15 VITALS
TEMPERATURE: 98 F | DIASTOLIC BLOOD PRESSURE: 82 MMHG | BODY MASS INDEX: 23.92 KG/M2 | RESPIRATION RATE: 20 BRPM | HEIGHT: 62 IN | SYSTOLIC BLOOD PRESSURE: 125 MMHG | OXYGEN SATURATION: 98 % | WEIGHT: 130 LBS | HEART RATE: 110 BPM

## 2024-04-15 DIAGNOSIS — G24.01 TARDIVE DYSKINESIA: Primary | ICD-10-CM

## 2024-04-15 DIAGNOSIS — R25.1 TREMOR: ICD-10-CM

## 2024-04-15 LAB
ALBUMIN SERPL BCP-MCNC: 4.4 G/DL (ref 3.5–5.2)
ALP SERPL-CCNC: 138 U/L (ref 55–135)
ALT SERPL W/O P-5'-P-CCNC: 6 U/L (ref 10–44)
ANION GAP SERPL CALC-SCNC: 12 MMOL/L (ref 8–16)
AST SERPL-CCNC: 13 U/L (ref 10–40)
BASOPHILS # BLD AUTO: 0.09 K/UL (ref 0–0.2)
BASOPHILS NFR BLD: 1.6 % (ref 0–1.9)
BILIRUB SERPL-MCNC: 0.3 MG/DL (ref 0.1–1)
BUN SERPL-MCNC: 10 MG/DL (ref 8–23)
CALCIUM SERPL-MCNC: 9.6 MG/DL (ref 8.7–10.5)
CHLORIDE SERPL-SCNC: 105 MMOL/L (ref 95–110)
CO2 SERPL-SCNC: 27 MMOL/L (ref 23–29)
CREAT SERPL-MCNC: 1.2 MG/DL (ref 0.5–1.4)
DIFFERENTIAL METHOD BLD: NORMAL
EOSINOPHIL # BLD AUTO: 0.1 K/UL (ref 0–0.5)
EOSINOPHIL NFR BLD: 1.6 % (ref 0–8)
ERYTHROCYTE [DISTWIDTH] IN BLOOD BY AUTOMATED COUNT: 13.6 % (ref 11.5–14.5)
EST. GFR  (NO RACE VARIABLE): 49 ML/MIN/1.73 M^2
GLUCOSE SERPL-MCNC: 92 MG/DL (ref 70–110)
HCT VFR BLD AUTO: 41 % (ref 37–48.5)
HGB BLD-MCNC: 13.5 G/DL (ref 12–16)
IMM GRANULOCYTES # BLD AUTO: 0.02 K/UL (ref 0–0.04)
IMM GRANULOCYTES NFR BLD AUTO: 0.3 % (ref 0–0.5)
LYMPHOCYTES # BLD AUTO: 1.1 K/UL (ref 1–4.8)
LYMPHOCYTES NFR BLD: 19.1 % (ref 18–48)
MAGNESIUM SERPL-MCNC: 2.1 MG/DL (ref 1.6–2.6)
MCH RBC QN AUTO: 30.3 PG (ref 27–31)
MCHC RBC AUTO-ENTMCNC: 32.9 G/DL (ref 32–36)
MCV RBC AUTO: 92 FL (ref 82–98)
MONOCYTES # BLD AUTO: 0.6 K/UL (ref 0.3–1)
MONOCYTES NFR BLD: 10.3 % (ref 4–15)
NEUTROPHILS # BLD AUTO: 3.8 K/UL (ref 1.8–7.7)
NEUTROPHILS NFR BLD: 67.1 % (ref 38–73)
NRBC BLD-RTO: 0 /100 WBC
PHOSPHATE SERPL-MCNC: 2.1 MG/DL (ref 2.7–4.5)
PLATELET # BLD AUTO: 259 K/UL (ref 150–450)
PMV BLD AUTO: 9.4 FL (ref 9.2–12.9)
POTASSIUM SERPL-SCNC: 3.5 MMOL/L (ref 3.5–5.1)
PROT SERPL-MCNC: 7.7 G/DL (ref 6–8.4)
RBC # BLD AUTO: 4.45 M/UL (ref 4–5.4)
SODIUM SERPL-SCNC: 144 MMOL/L (ref 136–145)
WBC # BLD AUTO: 5.72 K/UL (ref 3.9–12.7)

## 2024-04-15 PROCEDURE — 84100 ASSAY OF PHOSPHORUS: CPT | Performed by: NURSE PRACTITIONER

## 2024-04-15 PROCEDURE — 63600175 PHARM REV CODE 636 W HCPCS: Performed by: EMERGENCY MEDICINE

## 2024-04-15 PROCEDURE — 96374 THER/PROPH/DIAG INJ IV PUSH: CPT

## 2024-04-15 PROCEDURE — 99284 EMERGENCY DEPT VISIT MOD MDM: CPT | Mod: 25

## 2024-04-15 PROCEDURE — 83735 ASSAY OF MAGNESIUM: CPT | Performed by: NURSE PRACTITIONER

## 2024-04-15 PROCEDURE — 80053 COMPREHEN METABOLIC PANEL: CPT | Performed by: NURSE PRACTITIONER

## 2024-04-15 PROCEDURE — 85025 COMPLETE CBC W/AUTO DIFF WBC: CPT | Performed by: NURSE PRACTITIONER

## 2024-04-15 RX ORDER — LORAZEPAM 2 MG/ML
2 INJECTION INTRAMUSCULAR
Status: COMPLETED | OUTPATIENT
Start: 2024-04-15 | End: 2024-04-15

## 2024-04-15 RX ORDER — LORAZEPAM 1 MG/1
1 TABLET ORAL EVERY 6 HOURS PRN
Qty: 4 TABLET | Refills: 0 | Status: SHIPPED | OUTPATIENT
Start: 2024-04-15

## 2024-04-15 RX ADMIN — LORAZEPAM 2 MG: 2 INJECTION INTRAMUSCULAR; INTRAVENOUS at 09:04

## 2024-04-16 NOTE — DISCHARGE INSTRUCTIONS
Please follow up with your primary care physician and/or psychiatrist for further evaluation and management. Please return with any new or worsening symptoms.

## 2024-04-16 NOTE — ED PROVIDER NOTES
Encounter Date: 4/15/2024       History     Chief Complaint   Patient presents with    Tremors     Pt reports to ED with family member with c/o worsening tremors x3 days. Family member reports recent fall 3/23/24 and hitting head. Pt also has tremors but family reports worse recently since new medications.      67 y/o F brought to the ER by family for evaluation of worsening tremor. Patient with history of tardive dyskinesia, has been having worsening tremors over the last several days. The patient and family have been unable to get in touch with the patient's psychiatrist who usually manages these symptoms. No other symptoms reported at this time.       Review of patient's allergies indicates:   Allergen Reactions    Lurasidone hcl     Olanzapine Other (See Comments)     Tardive dyskinesia, dystonia      Quetiapine      Other reaction(s): Tardive dyskinesia    Risperidone Other (See Comments)     Tardive dyskinesia.   Risperdal, Zyprexa and Latuda all caused Tardive Dyskinsia        No past medical history on file.  No past surgical history on file.  No family history on file.  Social History     Tobacco Use    Smoking status: Never    Smokeless tobacco: Never   Substance Use Topics    Alcohol use: Never    Drug use: Never     Review of Systems   Constitutional:  Negative for chills and fever.   HENT:  Negative for congestion.    Respiratory:  Negative for cough and shortness of breath.    Cardiovascular:  Negative for chest pain.   Gastrointestinal:  Negative for abdominal pain.   Musculoskeletal:  Negative for back pain.   Neurological:  Negative for light-headedness and headaches.       Physical Exam     Initial Vitals [04/15/24 1753]   BP Pulse Resp Temp SpO2   125/70 110 20 97.8 °F (36.6 °C) 98 %      MAP       --         Physical Exam    Nursing note and vitals reviewed.  Constitutional: She appears well-developed and well-nourished. No distress.   HENT:   Head: Normocephalic and atraumatic.   Eyes:  Conjunctivae and EOM are normal. Pupils are equal, round, and reactive to light.   Neck: Neck supple. No tracheal deviation present.   Normal range of motion.  Cardiovascular:  Normal rate and intact distal pulses.           Pulmonary/Chest: No respiratory distress.   Musculoskeletal:         General: No edema. Normal range of motion.      Cervical back: Normal range of motion and neck supple.     Neurological: She is alert and oriented to person, place, and time. She has normal strength. No cranial nerve deficit.   Resting tremor with some facial writhing noted on exam    Skin: Skin is warm and dry.         ED Course   Procedures  Labs Reviewed   COMPREHENSIVE METABOLIC PANEL - Abnormal; Notable for the following components:       Result Value    Alkaline Phosphatase 138 (*)     ALT 6 (*)     eGFR 49 (*)     All other components within normal limits   PHOSPHORUS - Abnormal; Notable for the following components:    Phosphorus 2.1 (*)     All other components within normal limits   CBC W/ AUTO DIFFERENTIAL   MAGNESIUM   URINALYSIS, REFLEX TO URINE CULTURE          Imaging Results    None          Medications   LORazepam injection 2 mg (2 mg Intravenous Given 4/15/24 2147)     Medical Decision Making  69 y/o F presents to the ER for worsening tardive dyskinesia      Differential: Progressive disease, dehydration, electrolyte dyscrasia, YOLY      Labs benign. Patient given some ativan. Discussed disposition with patient and family including discharge with rx for ativan for a few days, home mgmt, f/u with PCP and psychiatry, strict return precautions given. VSS.     Amount and/or Complexity of Data Reviewed  External Data Reviewed: notes.     Details: Reviewed most recent PCP note documenting baseline medications and PMH   Labs: ordered.     Details: CBC without leukocytosis, normal H/H; CMP with normal renal and liver function tests, normal electrolytes; magnesium WNL     Risk  OTC drugs.  Prescription drug  management.  Parenteral controlled substances.               ED Course as of 04/15/24 2215   Mon Apr 15, 2024   1754 CT Cervical Spine wo IV Contrast   Final Result     CERVICAL SPONDYLOSIS.     NO ACUTE OSSEOUS ABNORMALITY.     Electronically Signed By: Kevin Foster MD 3/23/2024 9:27 PM CDT     CT Head without Contrast   Final Result     NO EVIDENCE FOR SKULL FRACTURE OR ACUTE INTRACRANIAL HEMORRHAGE.     CHRONIC RIGHT TEMPOROPARIETAL CORTICAL INFARCT.     MODERATE CHANGES OF SMALL VESSEL ISCHEMIC DISEASE.     Electronically Signed By: Kevin Foster MD 3/23/2024 9:26 PM CDT        [DT]      ED Course User Index  [DT] Jessica Irby NP                           Clinical Impression:  Final diagnoses:  [G24.01] Tardive dyskinesia (Primary)  [R25.1] Tremor          ED Disposition Condition    Discharge Stable          ED Prescriptions       Medication Sig Dispense Start Date End Date Auth. Provider    LORazepam (ATIVAN) 1 MG tablet Take 1 tablet (1 mg total) by mouth every 6 (six) hours as needed (Tremor). 4 tablet 4/15/2024 -- Jose Andrade MD          Follow-up Information       Follow up With Specialties Details Why Contact Info    Your primary care physician and/or Psychiatrist  Schedule an appointment as soon as possible for a visit                Jose Andrade MD  04/15/24 0381

## 2024-04-23 ENCOUNTER — DOCUMENT SCAN (OUTPATIENT)
Dept: HOME HEALTH SERVICES | Facility: HOSPITAL | Age: 69
End: 2024-04-23
Payer: MEDICAID

## 2025-05-20 NOTE — PT/OT/SLP EVAL
Physical Therapy Evaluation    Patient Name:  Sherie Fernandez   MRN:  [id number]  Admit Date: 1/30/2024  Admitting Diagnosis:  <principal problem not specified>  Length of Stay: 0 days  Recent Surgery: * No surgery found *      Recommendations:     Discharge Recommendations:  Moderate intensity therapy at discharge  Discharge Equipment Recommendations: walker, rolling   DME Justification: Patient demonstrates a mobility limitation that significantly impairs their ability to participate in one or more mobility related activities of daily living. Patient's mobility limitation cannot be sufficiently resolved with the use of a cane, but can be sufficiently resolved with the use of a rolling walker.The use of a rolling walker will considerably improve their ability to participate in MRADLs. Patient will use the walker on a regular basis at home.  Barriers to discharge: Decreased caregiver support    Highest Level of Mobility: 4 steps  Assistance Needed: minimal assistance    Assessment:     Sherie Fernandez is a 68 y.o. female admitted with a medical diagnosis of progressive weakness. Medical history includes OCD and tardive dyskinesia. She is most limited today by weakness. Today, she was able to perform bed mobility, standing, transfers, and took a few steps. She was very unsteady throughout the session. Based on clinical presentation, co-morbidities, and today's performance, patient would benefit from acute skilled physical therapy services to improve functional mobility and return to max capacity prior level of function. Patient currently demonstrates appropriate participation and motivation for moderate intensity therapy. The patient exhibits the ability to participate in therapy services on a daily basis after discharging from the hospital.  See detailed evaluation below:    Problem List: weakness, impaired endurance, impaired sensation, impaired self care skills, impaired functional mobility, gait instability, impaired  Thank you for allowing us to be a part of your care today. We strive to provide the best care for you today and in the future. Here are a few important reminders:   Our goal is to review and report all test/imaging results within 24-48 hours (unless otherwise specified by the clinician). If you have not received your test results within this time period, please contact the clinic at [phone] and ask to speak to a RN Specialist or Cancer Care Coordinator.   You can also receive your test results on-line quickly and easily by enrolling in [employer] by visiting [email or web address].  Central Scheduling should contact you in 24-48 hours if any imaging is ordered during this visit. Please contact Central Scheduling at [phone] in case you do not receive a call.   Due to the recent change in narcotic laws and our commitment to patient safety and well-being, narcotic refills will be evaluated on a strict case by case basis. We will not address any requests for re-fills after noon on Fridays.   If you have paperwork, complete your portion of the paperwork and either drop off or fax the forms to [phone]. Make sure to leave clear instruction of where you would like the completed paperwork to go and include a valid phone number. It may take staff up to 7 days to complete.   balance, decreased upper extremity function, decreased lower extremity function, decreased safety awareness, pain, decreased ROM, impaired coordination, impaired fine motor  Rehab Prognosis: Good    Plan:     During this hospitalization, patient to be seen 3 x/week to address the identified impairments via gait training, therapeutic activities, therapeutic exercises, neuromuscular re-education and progress towards the established goals.    Plan of Care Expires:  03/01/24    Subjective   Communicated with RN prior to session.  Patient found HOB elevated upon PT entry to room, agreeable to evaluation.     Chief Complaint: Fatigue (Weakness x 3 weeks, hx of tremors, reports they are getting worse)    Patient/Family Comments/goals: to get better  Pain/Comfort:  Pain Rating 1:  (did not rate)  Location - Side 1: Left  Location - Orientation 1: lower  Location 1:  (pelvis)  Pain Addressed 1: Reposition, Distraction  Pain Rating Post-Intervention 1:  (did not rate)    Living Environment:  Patient reports moving into a group home about 5 days ago.      Prior Level of Function:   Patient reports being independent with mobility & with ADLs. Patient owns DME as follows: none.  Reports a decline in function since she was discharged from the hospital.    Patient reports they will have assistance from no one upon discharge.    Objective:   Patient found with: telemetry, pulse ox (continuous), peripheral IV, PureWick, blood pressure cuff     General Precautions: Standard, fall   Orthopedic Precautions:N/A   Braces: N/A   Oxygen Device: Room Air  Vitals: BP (!) 152/80   Pulse 65   Temp 98 °F (36.7 °C) (Oral)   Resp 20   Wt 59 kg (130 lb)   SpO2 98%   BMI 23.78 kg/m²     Exams:  Cognition:   Alert and Cooperative  AxOx4 - some confusion and perseverating during the session  Command following: Follows one-step commands  Fluency: clear/fluent  Hearing: Intact  Vision:  Intact visual fields  Skin Integrity: intact  Sensation:  impaired to bilateral feet  Coordination: globally impaired  LE Strength:  L Lower Extremity: grossly 3-/5  R Lower Extremity: grossly 3-/5  LE ROM:  L Lower Extremity: WFL  R Lower Extremity: WFL      Outcome Measures:  AM-PAC 6 CLICK MOBILITY  Turning over in bed (including adjusting bedclothes, sheets and blankets)?: 3  Sitting down on and standing up from a chair with arms (e.g., wheelchair, bedside commode, etc.): 3  Moving from lying on back to sitting on the side of the bed?: 3  Moving to and from a bed to a chair (including a wheelchair)?: 3  Need to walk in hospital room?: 3  Climbing 3-5 steps with a railing?: 2  Basic Mobility Total Score: 17     Functional Mobility:    Bed Mobility:  Supine to Sit: minimum assistance  Scooting anteriorly to EOB to have both feet planted on floor: minimum assistance  Sit to Supine: moderate assistance    Sitting Balance at Edge of Bed:  Assistance Level Required: Stand-by Assistance  Postural deviations noted: rounded shoulders, forward head, posterior pelvic tilt, and posterior lean  Cueing provided for: upright and midline sitting posture and correcting pelvic tilt    Transfers:   Sit <> Stand Transfer: minimum assistance with no assistive device   Standing Tolerance: moderate assistance with no assistive device   Deviations: flexed posture, multidirectional sway, and posterior lean onto mattress    Gait:   Patient ambulated: 4 steps   Patient required: minimal assist  Patient used: hand-held assist  Gait Deviation(s): decreased speed, decreased step length, shuffling pattern, posterior lean, poor step coordination, generalized instability, narrow base of support, and minimal arm swing  Cueing provided for: step sequencing, upright posture, and adequate step length    Neuro Re-Education:   Patient provided with the following neuro based interventions: verbal cueing and education for optimal posture and manual cueing and education for optimal posture    Therapeutic  Activities:   Patient participated in the following therapeutic activities: bed mobility, standing transitions, and walking    Education:   Patient was educated on the following:  Role of PT, plan of care, and goals  In room safety and use of call button  Importance of continued upright mobility and exercise  Balancing rest and activity      Patient left HOB elevated with all lines intact, call button in reach, and RN notified.    GOALS:   Multidisciplinary Problems       Physical Therapy Goals          Problem: Physical Therapy    Goal Priority Disciplines Outcome Goal Variances Interventions   Physical Therapy Goal     PT, PT/OT Ongoing, Progressing     Description: PT goals to be met by: 2/29/24    Patient will perform supine <> sitting with supervision.  Patient will perform sit <> stand transitions with supervision using RW.  Patient will perform transfers from bed <> chair or BSC with supervision using RW.  Patient will ambulate 100 feet with supervision using RW.                       History:     History reviewed. No pertinent past medical history.    History reviewed. No pertinent surgical history.    Time Tracking:     PT Received On: 01/31/24  PT Start Time: 0830     PT Stop Time: 0854  PT Total Time (min): 24 min     Additional staff present: OT - Co-evaluation with OT due to patient complexity and poor safety awareness. Patient required two skilled therapists to ensure safe mobilization.    Billable Minutes: Evaluation 10 and Gait Training 14    Jennifer Russell, PT, DPT  1/31/2024